# Patient Record
Sex: MALE | Race: WHITE | NOT HISPANIC OR LATINO | Employment: OTHER | URBAN - METROPOLITAN AREA
[De-identification: names, ages, dates, MRNs, and addresses within clinical notes are randomized per-mention and may not be internally consistent; named-entity substitution may affect disease eponyms.]

---

## 2021-04-08 DIAGNOSIS — Z23 ENCOUNTER FOR IMMUNIZATION: ICD-10-CM

## 2023-01-12 ENCOUNTER — HOSPITAL ENCOUNTER (INPATIENT)
Facility: HOSPITAL | Age: 73
LOS: 4 days | Discharge: HOME | End: 2023-01-16
Attending: EMERGENCY MEDICINE | Admitting: INTERNAL MEDICINE

## 2023-01-12 ENCOUNTER — APPOINTMENT (EMERGENCY)
Dept: RADIOLOGY | Facility: HOSPITAL | Age: 73
End: 2023-01-12
Attending: EMERGENCY MEDICINE

## 2023-01-12 DIAGNOSIS — U07.1 COVID: Primary | ICD-10-CM

## 2023-01-12 DIAGNOSIS — R09.02 HYPOXIA: ICD-10-CM

## 2023-01-12 DIAGNOSIS — R55 SYNCOPE: ICD-10-CM

## 2023-01-12 DIAGNOSIS — G20 PARKINSON'S DISEASE (HCC): ICD-10-CM

## 2023-01-12 DIAGNOSIS — R41.3 MEMORY LOSS: ICD-10-CM

## 2023-01-12 DIAGNOSIS — U07.1 COVID-19: ICD-10-CM

## 2023-01-12 PROBLEM — Z85.118 HISTORY OF LUNG CANCER: Status: ACTIVE | Noted: 2023-01-12

## 2023-01-12 PROBLEM — F32.A ANXIETY AND DEPRESSION: Status: ACTIVE | Noted: 2023-01-12

## 2023-01-12 PROBLEM — J44.1 CHRONIC OBSTRUCTIVE PULMONARY DISEASE WITH ACUTE EXACERBATION (HCC): Status: ACTIVE | Noted: 2023-01-12

## 2023-01-12 PROBLEM — Z85.46 HISTORY OF PROSTATE CANCER: Status: ACTIVE | Noted: 2023-01-12

## 2023-01-12 PROBLEM — F41.9 ANXIETY AND DEPRESSION: Status: ACTIVE | Noted: 2023-01-12

## 2023-01-12 PROBLEM — C25.9 PANCREATIC CANCER (HCC): Status: ACTIVE | Noted: 2023-01-12

## 2023-01-12 PROBLEM — G20.A1 PARKINSON'S DISEASE (HCC): Status: ACTIVE | Noted: 2023-01-12

## 2023-01-12 PROBLEM — R03.0 ELEVATED BLOOD PRESSURE READING: Status: ACTIVE | Noted: 2023-01-12

## 2023-01-12 LAB
2HR DELTA HS TROPONIN: 2 NG/L
4HR DELTA HS TROPONIN: 0 NG/L
ALBUMIN SERPL BCP-MCNC: 3.2 G/DL (ref 3.5–5)
ALP SERPL-CCNC: 60 U/L (ref 46–116)
ALT SERPL W P-5'-P-CCNC: 9 U/L (ref 12–78)
ANION GAP SERPL CALCULATED.3IONS-SCNC: 0 MMOL/L (ref 4–13)
APTT PPP: 31 SECONDS (ref 23–37)
AST SERPL W P-5'-P-CCNC: 14 U/L (ref 5–45)
ATRIAL RATE: 78 BPM
BACTERIA UR QL AUTO: ABNORMAL /HPF
BASOPHILS # BLD AUTO: 0.03 THOUSANDS/ÂΜL (ref 0–0.1)
BASOPHILS NFR BLD AUTO: 1 % (ref 0–1)
BILIRUB SERPL-MCNC: 0.6 MG/DL (ref 0.2–1)
BILIRUB UR QL STRIP: NEGATIVE
BUN SERPL-MCNC: 15 MG/DL (ref 5–25)
CALCIUM ALBUM COR SERPL-MCNC: 8.9 MG/DL (ref 8.3–10.1)
CALCIUM SERPL-MCNC: 8.3 MG/DL (ref 8.3–10.1)
CARDIAC TROPONIN I PNL SERPL HS: 11 NG/L
CARDIAC TROPONIN I PNL SERPL HS: 9 NG/L
CARDIAC TROPONIN I PNL SERPL HS: 9 NG/L
CHLORIDE SERPL-SCNC: 105 MMOL/L (ref 96–108)
CLARITY UR: CLEAR
CO2 SERPL-SCNC: 32 MMOL/L (ref 21–32)
COLOR UR: YELLOW
CREAT SERPL-MCNC: 1.06 MG/DL (ref 0.6–1.3)
EOSINOPHIL # BLD AUTO: 0.07 THOUSAND/ÂΜL (ref 0–0.61)
EOSINOPHIL NFR BLD AUTO: 1 % (ref 0–6)
ERYTHROCYTE [DISTWIDTH] IN BLOOD BY AUTOMATED COUNT: 13.7 % (ref 11.6–15.1)
FLUAV RNA RESP QL NAA+PROBE: NEGATIVE
FLUBV RNA RESP QL NAA+PROBE: NEGATIVE
GFR SERPL CREATININE-BSD FRML MDRD: 69 ML/MIN/1.73SQ M
GLUCOSE SERPL-MCNC: 111 MG/DL (ref 65–140)
GLUCOSE UR STRIP-MCNC: NEGATIVE MG/DL
HCT VFR BLD AUTO: 45 % (ref 36.5–49.3)
HGB BLD-MCNC: 14.2 G/DL (ref 12–17)
HGB UR QL STRIP.AUTO: NEGATIVE
IMM GRANULOCYTES # BLD AUTO: 0.06 THOUSAND/UL (ref 0–0.2)
IMM GRANULOCYTES NFR BLD AUTO: 1 % (ref 0–2)
INR PPP: 0.97 (ref 0.84–1.19)
KETONES UR STRIP-MCNC: ABNORMAL MG/DL
LACTATE SERPL-SCNC: 0.8 MMOL/L (ref 0.5–2)
LEUKOCYTE ESTERASE UR QL STRIP: NEGATIVE
LYMPHOCYTES # BLD AUTO: 0.51 THOUSANDS/ÂΜL (ref 0.6–4.47)
LYMPHOCYTES NFR BLD AUTO: 9 % (ref 14–44)
MAGNESIUM SERPL-MCNC: 1.8 MG/DL (ref 1.6–2.6)
MCH RBC QN AUTO: 31.2 PG (ref 26.8–34.3)
MCHC RBC AUTO-ENTMCNC: 31.6 G/DL (ref 31.4–37.4)
MCV RBC AUTO: 99 FL (ref 82–98)
MONOCYTES # BLD AUTO: 0.5 THOUSAND/ÂΜL (ref 0.17–1.22)
MONOCYTES NFR BLD AUTO: 9 % (ref 4–12)
MUCOUS THREADS UR QL AUTO: ABNORMAL
NEUTROPHILS # BLD AUTO: 4.67 THOUSANDS/ÂΜL (ref 1.85–7.62)
NEUTS SEG NFR BLD AUTO: 79 % (ref 43–75)
NITRITE UR QL STRIP: NEGATIVE
NON-SQ EPI CELLS URNS QL MICRO: ABNORMAL /HPF
NRBC BLD AUTO-RTO: 0 /100 WBCS
NT-PROBNP SERPL-MCNC: 604 PG/ML
P AXIS: 51 DEGREES
PH UR STRIP.AUTO: 8 [PH]
PLATELET # BLD AUTO: 141 THOUSANDS/UL (ref 149–390)
PLATELET # BLD AUTO: 149 THOUSANDS/UL (ref 149–390)
PMV BLD AUTO: 9.4 FL (ref 8.9–12.7)
PMV BLD AUTO: 9.6 FL (ref 8.9–12.7)
POTASSIUM SERPL-SCNC: 3.8 MMOL/L (ref 3.5–5.3)
PR INTERVAL: 188 MS
PROCALCITONIN SERPL-MCNC: 0.1 NG/ML
PROT SERPL-MCNC: 6.8 G/DL (ref 6.4–8.4)
PROT UR STRIP-MCNC: ABNORMAL MG/DL
PROTHROMBIN TIME: 13.5 SECONDS (ref 11.6–14.5)
QRS AXIS: 35 DEGREES
QRSD INTERVAL: 98 MS
QT INTERVAL: 390 MS
QTC INTERVAL: 444 MS
RBC # BLD AUTO: 4.55 MILLION/UL (ref 3.88–5.62)
RBC #/AREA URNS AUTO: ABNORMAL /HPF
RSV RNA RESP QL NAA+PROBE: NEGATIVE
SARS-COV-2 RNA RESP QL NAA+PROBE: POSITIVE
SODIUM SERPL-SCNC: 137 MMOL/L (ref 135–147)
SP GR UR STRIP.AUTO: 1.01 (ref 1–1.03)
T WAVE AXIS: 64 DEGREES
UROBILINOGEN UR STRIP-ACNC: <2 MG/DL
VENTRICULAR RATE: 78 BPM
WBC # BLD AUTO: 5.84 THOUSAND/UL (ref 4.31–10.16)
WBC #/AREA URNS AUTO: ABNORMAL /HPF

## 2023-01-12 PROCEDURE — XW033E5 INTRODUCTION OF REMDESIVIR ANTI-INFECTIVE INTO PERIPHERAL VEIN, PERCUTANEOUS APPROACH, NEW TECHNOLOGY GROUP 5: ICD-10-PCS | Performed by: INTERNAL MEDICINE

## 2023-01-12 PROCEDURE — 8E0ZXY6 ISOLATION: ICD-10-PCS | Performed by: INTERNAL MEDICINE

## 2023-01-12 RX ORDER — FESOTERODINE FUMARATE 8 MG/1
TABLET, EXTENDED RELEASE ORAL DAILY
COMMUNITY

## 2023-01-12 RX ORDER — PRIMIDONE 50 MG/1
50 TABLET ORAL
COMMUNITY

## 2023-01-12 RX ORDER — AMANTADINE HYDROCHLORIDE 100 MG/1
100 CAPSULE, GELATIN COATED ORAL 3 TIMES DAILY
COMMUNITY

## 2023-01-12 RX ORDER — ESCITALOPRAM OXALATE 20 MG/1
20 TABLET ORAL DAILY
COMMUNITY

## 2023-01-12 RX ORDER — PROPRANOLOL HYDROCHLORIDE 60 MG/1
30 TABLET ORAL
COMMUNITY
End: 2023-01-16

## 2023-01-12 RX ORDER — ZINC GLUCONATE 50 MG
TABLET ORAL
COMMUNITY

## 2023-01-12 RX ORDER — METHYLPREDNISOLONE SODIUM SUCCINATE 125 MG/2ML
125 INJECTION, POWDER, LYOPHILIZED, FOR SOLUTION INTRAMUSCULAR; INTRAVENOUS ONCE
Status: COMPLETED | OUTPATIENT
Start: 2023-01-12 | End: 2023-01-12

## 2023-01-12 RX ORDER — AMANTADINE HYDROCHLORIDE 100 MG/1
100 CAPSULE, GELATIN COATED ORAL 3 TIMES DAILY
Status: DISCONTINUED | OUTPATIENT
Start: 2023-01-12 | End: 2023-01-12

## 2023-01-12 RX ORDER — CLONAZEPAM 1 MG/1
2 TABLET ORAL
Status: DISCONTINUED | OUTPATIENT
Start: 2023-01-12 | End: 2023-01-16 | Stop reason: HOSPADM

## 2023-01-12 RX ORDER — UMECLIDINIUM 62.5 UG/1
1 AEROSOL, POWDER ORAL DAILY
COMMUNITY

## 2023-01-12 RX ORDER — ESCITALOPRAM OXALATE 20 MG/1
20 TABLET ORAL DAILY
Status: DISCONTINUED | OUTPATIENT
Start: 2023-01-12 | End: 2023-01-16 | Stop reason: HOSPADM

## 2023-01-12 RX ORDER — CLONAZEPAM 1 MG/1
2 TABLET ORAL
COMMUNITY

## 2023-01-12 RX ORDER — ALFUZOSIN HYDROCHLORIDE 10 MG/1
10 TABLET, EXTENDED RELEASE ORAL DAILY
COMMUNITY

## 2023-01-12 RX ORDER — SENNA PLUS 8.6 MG/1
1 TABLET ORAL DAILY
COMMUNITY

## 2023-01-12 RX ORDER — METHYLPREDNISOLONE SODIUM SUCCINATE 40 MG/ML
40 INJECTION, POWDER, LYOPHILIZED, FOR SOLUTION INTRAMUSCULAR; INTRAVENOUS EVERY 8 HOURS SCHEDULED
Status: DISCONTINUED | OUTPATIENT
Start: 2023-01-12 | End: 2023-01-14

## 2023-01-12 RX ORDER — ACETAMINOPHEN 325 MG/1
650 TABLET ORAL EVERY 6 HOURS PRN
Status: DISCONTINUED | OUTPATIENT
Start: 2023-01-12 | End: 2023-01-16 | Stop reason: HOSPADM

## 2023-01-12 RX ORDER — OXYBUTYNIN CHLORIDE 5 MG/1
10 TABLET, EXTENDED RELEASE ORAL DAILY
Status: DISCONTINUED | OUTPATIENT
Start: 2023-01-12 | End: 2023-01-16 | Stop reason: HOSPADM

## 2023-01-12 RX ORDER — MULTIVIT WITH MIN/MFOLATE/K2 340-15/3 G
5000 POWDER (GRAM) ORAL DAILY
COMMUNITY

## 2023-01-12 RX ORDER — ALBUTEROL SULFATE 90 UG/1
1 AEROSOL, METERED RESPIRATORY (INHALATION) EVERY 4 HOURS PRN
Status: DISCONTINUED | OUTPATIENT
Start: 2023-01-12 | End: 2023-01-16 | Stop reason: HOSPADM

## 2023-01-12 RX ORDER — MEMANTINE HYDROCHLORIDE 10 MG/1
10 TABLET ORAL 2 TIMES DAILY
COMMUNITY

## 2023-01-12 RX ORDER — ONDANSETRON 2 MG/ML
4 INJECTION INTRAMUSCULAR; INTRAVENOUS EVERY 6 HOURS PRN
Status: DISCONTINUED | OUTPATIENT
Start: 2023-01-12 | End: 2023-01-16 | Stop reason: HOSPADM

## 2023-01-12 RX ORDER — ZINC SULFATE 50(220)MG
220 CAPSULE ORAL DAILY
Status: DISCONTINUED | OUTPATIENT
Start: 2023-01-12 | End: 2023-01-16 | Stop reason: HOSPADM

## 2023-01-12 RX ORDER — MEMANTINE HYDROCHLORIDE 5 MG/1
10 TABLET ORAL 2 TIMES DAILY
Status: DISCONTINUED | OUTPATIENT
Start: 2023-01-12 | End: 2023-01-16 | Stop reason: HOSPADM

## 2023-01-12 RX ORDER — DEXAMETHASONE SODIUM PHOSPHATE 4 MG/ML
6 INJECTION, SOLUTION INTRA-ARTICULAR; INTRALESIONAL; INTRAMUSCULAR; INTRAVENOUS; SOFT TISSUE EVERY 24 HOURS
Status: CANCELLED | OUTPATIENT
Start: 2023-01-12 | End: 2023-01-22

## 2023-01-12 RX ORDER — MULTIVIT WITH MINERALS/LUTEIN
1000 TABLET ORAL DAILY
COMMUNITY

## 2023-01-12 RX ORDER — FLUTICASONE FUROATE AND VILANTEROL 200; 25 UG/1; UG/1
1 POWDER RESPIRATORY (INHALATION) DAILY
COMMUNITY

## 2023-01-12 RX ORDER — ACETAMINOPHEN 325 MG/1
650 TABLET ORAL ONCE
Status: COMPLETED | OUTPATIENT
Start: 2023-01-12 | End: 2023-01-12

## 2023-01-12 RX ORDER — PHENOL 1.4 %
AEROSOL, SPRAY (ML) MUCOUS MEMBRANE
COMMUNITY

## 2023-01-12 RX ORDER — PRIMIDONE 50 MG/1
50 TABLET ORAL
Status: DISCONTINUED | OUTPATIENT
Start: 2023-01-12 | End: 2023-01-16 | Stop reason: HOSPADM

## 2023-01-12 RX ORDER — AMANTADINE HYDROCHLORIDE 100 MG/1
100 CAPSULE, GELATIN COATED ORAL 2 TIMES DAILY
Status: DISCONTINUED | OUTPATIENT
Start: 2023-01-12 | End: 2023-01-16 | Stop reason: HOSPADM

## 2023-01-12 RX ORDER — SENNOSIDES 8.6 MG
3 TABLET ORAL 2 TIMES DAILY
Status: DISCONTINUED | OUTPATIENT
Start: 2023-01-12 | End: 2023-01-16 | Stop reason: HOSPADM

## 2023-01-12 RX ORDER — ENOXAPARIN SODIUM 100 MG/ML
40 INJECTION SUBCUTANEOUS DAILY
Status: DISCONTINUED | OUTPATIENT
Start: 2023-01-12 | End: 2023-01-13

## 2023-01-12 RX ORDER — LANOLIN ALCOHOL/MO/W.PET/CERES
9 CREAM (GRAM) TOPICAL
Status: DISCONTINUED | OUTPATIENT
Start: 2023-01-12 | End: 2023-01-16 | Stop reason: HOSPADM

## 2023-01-12 RX ORDER — FLUTICASONE FUROATE AND VILANTEROL 200; 25 UG/1; UG/1
1 POWDER RESPIRATORY (INHALATION) DAILY
Status: DISCONTINUED | OUTPATIENT
Start: 2023-01-12 | End: 2023-01-16 | Stop reason: HOSPADM

## 2023-01-12 RX ORDER — SENNOSIDES 8.6 MG
1 TABLET ORAL DAILY
Status: DISCONTINUED | OUTPATIENT
Start: 2023-01-12 | End: 2023-01-12

## 2023-01-12 RX ORDER — MAGNESIUM HYDROXIDE/ALUMINUM HYDROXICE/SIMETHICONE 120; 1200; 1200 MG/30ML; MG/30ML; MG/30ML
30 SUSPENSION ORAL EVERY 6 HOURS PRN
Status: DISCONTINUED | OUTPATIENT
Start: 2023-01-12 | End: 2023-01-16 | Stop reason: HOSPADM

## 2023-01-12 RX ORDER — POLYETHYLENE GLYCOL 3350 17 G/17G
17 POWDER, FOR SOLUTION ORAL DAILY
Status: DISCONTINUED | OUTPATIENT
Start: 2023-01-12 | End: 2023-01-16 | Stop reason: HOSPADM

## 2023-01-12 RX ADMIN — AMANTADINE HYDROCHLORIDE 100 MG: 100 CAPSULE ORAL at 15:30

## 2023-01-12 RX ADMIN — REMDESIVIR 200 MG: 100 INJECTION, POWDER, LYOPHILIZED, FOR SOLUTION INTRAVENOUS at 18:20

## 2023-01-12 RX ADMIN — SODIUM CHLORIDE 1000 ML: 0.9 INJECTION, SOLUTION INTRAVENOUS at 06:51

## 2023-01-12 RX ADMIN — CARBIDOPA AND LEVODOPA 2 TABLET: 25; 100 TABLET ORAL at 18:19

## 2023-01-12 RX ADMIN — CLONAZEPAM 2 MG: 1 TABLET ORAL at 20:43

## 2023-01-12 RX ADMIN — OXYBUTYNIN CHLORIDE 10 MG: 5 TABLET, EXTENDED RELEASE ORAL at 18:19

## 2023-01-12 RX ADMIN — ZINC SULFATE 220 MG (50 MG) CAPSULE 220 MG: CAPSULE at 13:01

## 2023-01-12 RX ADMIN — CARBIDOPA AND LEVODOPA 2 TABLET: 25; 100 TABLET ORAL at 20:42

## 2023-01-12 RX ADMIN — METHYLPREDNISOLONE SODIUM SUCCINATE 125 MG: 125 INJECTION, POWDER, FOR SOLUTION INTRAMUSCULAR; INTRAVENOUS at 07:02

## 2023-01-12 RX ADMIN — METHYLPREDNISOLONE SODIUM SUCCINATE 40 MG: 40 INJECTION, POWDER, FOR SOLUTION INTRAMUSCULAR; INTRAVENOUS at 20:44

## 2023-01-12 RX ADMIN — CARBIDOPA AND LEVODOPA 3 TABLET: 25; 100 TABLET ORAL at 15:30

## 2023-01-12 RX ADMIN — FLUTICASONE FUROATE AND VILANTEROL TRIFENATATE 1 PUFF: 200; 25 POWDER RESPIRATORY (INHALATION) at 13:03

## 2023-01-12 RX ADMIN — AZITHROMYCIN MONOHYDRATE 500 MG: 500 INJECTION, POWDER, LYOPHILIZED, FOR SOLUTION INTRAVENOUS at 13:07

## 2023-01-12 RX ADMIN — PROPRANOLOL HYDROCHLORIDE 30 MG: 20 TABLET ORAL at 15:29

## 2023-01-12 RX ADMIN — MEMANTINE 10 MG: 5 TABLET ORAL at 20:44

## 2023-01-12 RX ADMIN — CARBIDOPA AND LEVODOPA 3 TABLET: 25; 100 TABLET ORAL at 13:02

## 2023-01-12 RX ADMIN — PRIMIDONE 50 MG: 50 TABLET ORAL at 20:44

## 2023-01-12 RX ADMIN — METHYLPREDNISOLONE SODIUM SUCCINATE 40 MG: 40 INJECTION, POWDER, FOR SOLUTION INTRAMUSCULAR; INTRAVENOUS at 15:30

## 2023-01-12 RX ADMIN — UMECLIDINIUM 1 PUFF: 62.5 AEROSOL, POWDER ORAL at 13:03

## 2023-01-12 RX ADMIN — ENOXAPARIN SODIUM 40 MG: 100 INJECTION SUBCUTANEOUS at 13:03

## 2023-01-12 RX ADMIN — ACETAMINOPHEN 650 MG: 325 TABLET ORAL at 07:02

## 2023-01-12 RX ADMIN — Medication 9 MG: at 20:44

## 2023-01-12 RX ADMIN — STANDARDIZED SENNA CONCENTRATE 25.8 MG: 8.6 TABLET ORAL at 20:43

## 2023-01-12 NOTE — ASSESSMENT & PLAN NOTE
· History of tobacco use  · Not chronically on O2  · Exacerbation in setting of COVID infection  · Continue IV steroids, scheduled neb treatments  · Respiratory protocol   · Zithromax x3 days

## 2023-01-12 NOTE — ASSESSMENT & PLAN NOTE
· On arrival to the ED  · Improved without further intervention  · Continue to monitor per unit protocol  · Propanolol on home medications likely prescribed for tremor   · Given parkinson's need to monitor for orthostatic BP

## 2023-01-12 NOTE — ASSESSMENT & PLAN NOTE
· SpO2 88% on RA - improved to 98% on 2 L NC  · Not on O2 at baseline  · CXR without focal consolidation or vascular congestion   · Procalcitonin normal  · Suspect related   COVID infection, COPD exac  · Continue O2 supplementation to maintain SpO2 > 88%

## 2023-01-12 NOTE — ED NOTES
Primary RN working closely with patient wife and pharmacy to ensure home medication orders are accurate       Keshawn Cohen RN  01/12/23 [id number]

## 2023-01-12 NOTE — H&P
[place]  H&P- Eula Oneil 1950, 67 y o  male MRN: [id number]  Unit/Bed#: ED 01 Encounter: [id number]  Primary Care Provider: Cassia Grimaldo DO   Date and time admitted to hospital: 1/12/2023  6:33 AM    * Hypoxia  Assessment & Plan  · SpO2 88% on RA - improved to 98% on 2 L NC  · Not on O2 at baseline  · CXR without focal consolidation or vascular congestion - f/u final read  · Procalcitonin normal  · Suspect related to COPD exacerbation in setting of COVID infection  · Continue O2 supplementation to maintain SpO2 > 88%    COVID-19  Assessment & Plan  · Presented to the ED following syncopal episode  Last few days has been having cough/congestion, fever  · Tmax 102 7 - does not meet any other SIRS criteria  · COVID + 1/12/23  · Currently requiring 2 L NC  · Treat via mild pathway - remdesivir/decadron  · Troponin WNL - follow up 2hr  · BNP only mildly elevated at 604  · Check d-dimer in order to dose inpt anticoagulation**    Chronic obstructive pulmonary disease with acute exacerbation (HCC)  Assessment & Plan  · History of tobacco use  · Not chronically on O2  · Exacerbation in setting of COVID infection  · Continue IV steroids, scheduled neb treatments  · Respiratory protocol  · Zithromax x3 days    Syncope  Assessment & Plan  · Presented to the ED with witnessed syncopal episode at home, reportedly no head strike  · Suspect related to hypoxia/COVID infection, possible orthostatic hypotension  · Check orthostatic BP q shift  · Per chart review of recent hematology note - pt had echocardiogram in October 2022 with EF 33% grade 1 diastolic dysfunction    No significant valvular disease  · Monitor on telemetry**    Anxiety and depression  Assessment & Plan  · Continue lexapro  · Continue klonopin qhs  · Confirmed via PDMP - last refill Oct 2022 for 90 day supply    Elevated blood pressure reading  Assessment & Plan  · On arrival to the ED  · Improved without further intervention  · Continue to monitor per unit protocol  · Propanolol on home medications likely prescribed for tremor**  · Given parkinson's need to monitor for orthostatic BP    Memory loss  Assessment & Plan  · Alert and oriented x3 on admission **  · Continue memantine    Parkinson's disease ([address] )  Assessment & Plan  · Continue sinemet, primidone, amantadine**  · Outpatient follow up with [facility]    History of lung cancer  Assessment & Plan  · Follows with [facility] oncology  · Recently completed radiation treatment in Nov 2022  · Also with history of prostate cancer s/p radiation, on Alfuzosin**    Pancreatic cancer [facility])  Assessment & Plan  · Recently diagnosed, follows with [facility]  · Scheduled for whipple procedure Feb 2023    VTE Pharmacologic Prophylaxis: VTE Score: 8 lovenox  Code Status: No Order    Discussion with family: pt wife bedside  Anticipated Length of Stay: Patient will be admitted on an inpatient basis with an anticipated length of stay of greater than 2 midnights secondary to hypoxia, COPD exacerbation, COVID 19  Total Time for Visit, including Counseling / Coordination of Care: 45 minutes Greater than 50% of this total time spent on direct patient counseling and coordination of care  Chief Complaint: Syncope    History of Present Illness:  Edgar Moore is a 67 y o  male with a PMH of lung cancer s/p radiation, prostate cancer s/p radiation, recently diagnosed pancreatic cancer, parkinson's, memory loss, COPD with prior tobacco use who presents with syncope  Patient presented to the ED following witnessed syncopal episode by his wife while ambulating  He reportedly had begun to feel lightheaded and was eased to the ground by his wife  No reported head strike or extremity injury  On EMS arrival patient was reportedly with SpO2 in the 80s  Not on chronic oxygen at home  Received nebulizer treatment in the ED with symptomatic improvement    Admits to a couple day history of congestion, cough, and fevers  home  Currently denies any chest pain or abdominal pain     Had a negative home COVID test 2 days prior  Review of Systems:  Review of Systems    Past Medical and Surgical History:   Past Medical History:   Diagnosis Date   • COPD (chronic obstructive pulmonary disease) ([facility] )    • Pancreatic cancer ([facility] )    • Parkinson's disease ([facility] )        History reviewed  No pertinent surgical history  Meds/Allergies:  Prior to Admission medications    Not on File     I have reviewed home medications with patient family member  Allergies: No Known Allergies    Social History:  Marital Status: /Civil Union      Substance Use History:   Social History     Substance and Sexual Activity   Alcohol Use None     Social History     Tobacco Use   Smoking Status Unknown   Smokeless Tobacco Not on file     Social History     Substance and Sexual Activity   Drug Use Never       Family History:  Family hx : positive for daughter with [address]       Physical Exam:     Vitals:   Blood Pressure: 167/77 (01/12/23 0900)  Pulse: 67 (01/12/23 0900)  Temperature: (!) 102 7 °F (39 3 °C) (01/12/23 0633)  Temp Source: Oral (01/12/23 1654)  Respirations: 18 (01/12/23 0900)  Height: 5' 9" (175 3 cm) (01/12/23 0633)  SpO2: 97 % (01/12/23 0900)    Physical Exam       GEN: No acute distress, comfortable, elderly male on o2  HEEENT: No JVD, PERRLA, no scleral icterus  RESP: Lungs wheezes  CV: RRR, +s1/s2   ABD: SOFT NON TENDER, POSITIVE BOWEL SOUNDS, NO DISTENTION  PSYCH: CALM  NEURO:  , NO FOCAL DEFICITS  SKIN: NO RASH  EXTREM: NO EDEMA      Additional Data:     Lab Results:  Results from last 7 days   Lab Units 01/12/23  0646   WBC Thousand/uL 5 84   HEMOGLOBIN g/dL 14 2   HEMATOCRIT % 45 0   PLATELETS Thousands/uL 141*   NEUTROS PCT % 79*   LYMPHS PCT % 9*   MONOS PCT % 9   EOS PCT % 1     Results from last 7 days   Lab Units 01/12/23  0646   SODIUM mmol/L 137   POTASSIUM mmol/L 3 8   CHLORIDE mmol/L 105   CO2 mmol/L 32   BUN mg/dL 15   CREATININE mg/dL 1 06   ANION GAP mmol/L 0*   CALCIUM mg/dL 8 3   ALBUMIN g/dL 3 2*   TOTAL BILIRUBIN mg/dL 0 60   ALK PHOS U/L 60   ALT U/L 9*   AST U/L 14   GLUCOSE RANDOM mg/dL 111     Results from last 7 days   Lab Units 01/12/23  0646   INR  0 97             Results from last 7 days   Lab Units 01/12/23  0646   LACTIC ACID mmol/L 0 8   PROCALCITONIN ng/ml 0 10       Lines/Drains:  Invasive Devices     Peripheral Intravenous Line  Duration           Peripheral IV 01/12/23 Distal;Left;Upper;Ventral (anterior) Arm <1 day    Peripheral IV 01/12/23 Proximal;Right;Ventral (anterior) Forearm <1 day                    Imaging: Reviewed radiology reports from this admission including: chest xray  XR chest portable    (Results Pending)     ·      ** Please Note: This note has been constructed using a voice recognition system   **

## 2023-01-12 NOTE — ED NOTES
Patient wife at bedside informed primary RN that patient is due soon (noon) for his dose of carbidopa/levodopa  Attending physician notified via Jerry Esteves RN  01/12/23 3869

## 2023-01-12 NOTE — ASSESSMENT & PLAN NOTE
· Follows with [facility] oncology  · Recently completed radiation treatment in Nov 2022  · Also with history of prostate cancer s/p radiation, on Alfuzosin**

## 2023-01-12 NOTE — ASSESSMENT & PLAN NOTE
· Presented to the ED with witnessed syncopal episode at home, reportedly no head strike  · Suspect related to hypoxia/COVID infection, possible orthostatic hypotension  · Check orthostatic BP q shift  · Per chart review of recent hematology note - pt had echocardiogram in October 2022 with EF 61% grade 1 diastolic dysfunction    No significant valvular disease  · Monitor on telemetry

## 2023-01-12 NOTE — LETTER
Thank you for allowing us to participate in the care of your patient, Reynaldo Lopez, who was hospitalized from 1/12 through 1/16/2023 with the admitting diagnosis of syncope and COVID 19  The patient was treated for COVID with IV steroids and remdesivir + supplemental O2  The patient's propranolol was discontinued due to bradycardia  He should follow up with outpatient cardiology  If you have any additional questions or would like to discuss further, please feel free to contact me      EDSON Ayala  Internal Medicine, Hospitalist  [phone]

## 2023-01-12 NOTE — PLAN OF CARE
Problem: PAIN - ADULT  Goal: Verbalizes/displays adequate comfort level or baseline comfort level  Description: Interventions:  - Encourage patient to monitor pain and request assistance  - Assess pain using appropriate pain scale  - Administer analgesics based on type and severity of pain and evaluate response  - Implement non-pharmacological measures as appropriate and evaluate response  - Consider cultural and social influences on pain and pain management  - Notify physician/advanced practitioner if interventions unsuccessful or patient reports new pain  Outcome: Progressing     Problem: INFECTION - ADULT  Goal: Absence or prevention of progression during hospitalization  Description: INTERVENTIONS:  - Assess and monitor for signs and symptoms of infection  - Monitor lab/diagnostic results  - Monitor all insertion sites, i e  indwelling lines, tubes, and drains  - Monitor endotracheal if appropriate and nasal secretions for changes in amount and color  - Wells appropriate cooling/warming therapies per order  - Administer medications as ordered  - Instruct and encourage patient and family to use good hand hygiene technique  - Identify and instruct in appropriate isolation precautions for identified infection/condition  Outcome: Progressing     Problem: SAFETY ADULT  Goal: Patient will remain free of falls  Description: INTERVENTIONS:  - Educate patient/family on patient safety including physical limitations  - Instruct patient to call for assistance with activity   - Consult OT/PT to assist with strengthening/mobility   - Keep Call bell within reach  - Keep bed low and locked with side rails adjusted as appropriate  - Keep care items and personal belongings within reach  - Initiate and maintain comfort rounds  - Make Fall Risk Sign visible to staff  - Offer Toileting every 2 Hours, in advance of need  - Initiate/Maintain bed alarm  - Obtain necessary fall risk management equipment: socks, alarm  - Apply yellow socks and bracelet for high fall risk patients  - Consider moving patient to room near nurses station  Outcome: Progressing  Goal: Maintain or return to baseline ADL function  Description: INTERVENTIONS:  -  Assess patient's ability to carry out ADLs; assess patient's baseline for ADL function and identify physical deficits which impact ability to perform ADLs (bathing, care of mouth/teeth, toileting, grooming, dressing, etc )  - Assess/evaluate cause of self-care deficits   - Assess range of motion  - Assess patient's mobility; develop plan if impaired  - Assess patient's need for assistive devices and provide as appropriate  - Encourage maximum independence but intervene and supervise when necessary  - Involve family in performance of ADLs  - Assess for home care needs following discharge   - Consider OT consult to assist with ADL evaluation and planning for discharge  - Provide patient education as appropriate  Outcome: Progressing  Goal: Maintains/Returns to pre admission functional level  Description: INTERVENTIONS:  - Perform BMAT or MOVE assessment daily    - Set and communicate daily mobility goal to care team and patient/family/caregiver  - Collaborate with rehabilitation services on mobility goals if consulted  - Perform Range of Motion 3 times a day  - Reposition patient every 2 hours    - Dangle patient 3 times a day  - Stand patient 3 times a day  - Ambulate patient 3 times a day  - Out of bed to chair 3 times a day   - Out of bed for meals 3 times a day  - Out of bed for toileting  - Record patient progress and toleration of activity level   Outcome: Progressing     Problem: DISCHARGE PLANNING  Goal: Discharge to home or other facility with appropriate resources  Description: INTERVENTIONS:  - Identify barriers to discharge w/patient and caregiver  - Arrange for needed discharge resources and transportation as appropriate  - Identify discharge learning needs (meds, wound care, etc )  - Arrange for interpretive services to assist at discharge as needed  - Refer to Case Management Department for coordinating discharge planning if the patient needs post-hospital services based on physician/advanced practitioner order or complex needs related to functional status, cognitive ability, or social support system  Outcome: Progressing     Problem: Knowledge Deficit  Goal: Patient/family/caregiver demonstrates understanding of disease process, treatment plan, medications, and discharge instructions  Description: Complete learning assessment and assess knowledge base  Interventions:  - Provide teaching at level of understanding  - Provide teaching via preferred learning methods  Outcome: Progressing     Problem: MOBILITY - ADULT  Goal: Maintain or return to baseline ADL function  Description: INTERVENTIONS:  -  Assess patient's ability to carry out ADLs; assess patient's baseline for ADL function and identify physical deficits which impact ability to perform ADLs (bathing, care of mouth/teeth, toileting, grooming, dressing, etc )  - Assess/evaluate cause of self-care deficits   - Assess range of motion  - Assess patient's mobility; develop plan if impaired  - Assess patient's need for assistive devices and provide as appropriate  - Encourage maximum independence but intervene and supervise when necessary  - Involve family in performance of ADLs  - Assess for home care needs following discharge   - Consider OT consult to assist with ADL evaluation and planning for discharge  - Provide patient education as appropriate  Outcome: Progressing  Goal: Maintains/Returns to pre admission functional level  Description: INTERVENTIONS:  - Perform BMAT or MOVE assessment daily    - Set and communicate daily mobility goal to care team and patient/family/caregiver  - Collaborate with rehabilitation services on mobility goals if consulted  - Perform Range of Motion 3 times a day  - Reposition patient every 2 hours    - Dangle patient 3 times a day  - Stand patient 3 times a day  - Ambulate patient 3 times a day  - Out of bed to chair 3 times a day   - Out of bed for meals 3 times a day  - Out of bed for toileting  - Record patient progress and toleration of activity level   Outcome: Progressing

## 2023-01-12 NOTE — ASSESSMENT & PLAN NOTE
· Continue lexapro  · Continue klonopin qhs  · Confirmed via PDMP - last refill Oct 2022 for 90 day supply

## 2023-01-12 NOTE — ED PROVIDER NOTES
History  No chief complaint on file  68 yo M with PMH of COPD, PD, prior pancreatic, lung, and prostate CA (pt states all in remission) BIBA from home after a witnessed syncopal event  Was walking to the bathroom, felt lightheaded, his wife was with him, helped him to the ground  No trauma  No head injury  Ems arrived, pt was hypoxic 80s  Improved on neb  Pt is alert, oriented, denies any pain  Feels improved after neb  Reports a neg covid home test 2 days ago, but has been congested, fevers, cough  States tolerating po  Denies abd pain or gi/gu complaints  No leg pain/swelling  History provided by:  Patient and medical records   used: No    Syncope  Episode history:  Single  Most recent episode: Today  Progression:  Resolved  Chronicity:  New  Context: exertion    Witnessed: yes    Relieved by:  Lying down  Ineffective treatments:  None tried  Associated symptoms: fever and shortness of breath    Associated symptoms: no chest pain, no confusion, no diaphoresis, no dizziness, no headaches, no nausea, no palpitations, no recent fall and no vomiting        None       Past Medical History:   Diagnosis Date   • [facility] (chronic obstructive pulmonary disease) ([facility] )    • Pancreatic cancer ([facility] )    • Parkinson's disease ([facility] )        History reviewed  No pertinent surgical history  History reviewed  No pertinent family history  I have reviewed and agree with the history as documented  E-Cigarette/Vaping     E-Cigarette/Vaping Substances     Social History     Tobacco Use   • Smoking status: Unknown   Substance Use Topics   • Drug use: Never       Review of Systems   Constitutional: Positive for fatigue and fever  Negative for chills, diaphoresis and unexpected weight change  HENT: Positive for congestion  Negative for ear pain, rhinorrhea, sore throat, trouble swallowing and voice change  Eyes: Negative for pain and visual disturbance     Respiratory: Positive for cough and shortness of breath  Negative for chest tightness  Cardiovascular: Positive for syncope  Negative for chest pain, palpitations and leg swelling  Gastrointestinal: Negative for abdominal pain, blood in stool, constipation, diarrhea, nausea and vomiting  Genitourinary: Negative for difficulty urinating and hematuria  Musculoskeletal: Negative for arthralgias, back pain and neck pain  Skin: Negative for rash  Neurological: Positive for syncope  Negative for dizziness, light-headedness and headaches  Psychiatric/Behavioral: Negative for confusion and suicidal ideas  The patient is not nervous/anxious  Physical Exam  Physical Exam  Vitals and nursing note reviewed  Constitutional:       General: He is not in acute distress  Appearance: He is well-developed  He is obese  He is ill-appearing  He is not diaphoretic  HENT:      Head: Normocephalic and atraumatic  Right Ear: External ear normal       Left Ear: External ear normal       Nose: Nose normal       Mouth/Throat:      Mouth: Mucous membranes are dry  Eyes:      General: No scleral icterus  Right eye: No discharge  Left eye: No discharge  Conjunctiva/sclera: Conjunctivae normal       Pupils: Pupils are equal, round, and reactive to light  Neck:      Vascular: No JVD  Trachea: No tracheal deviation  Cardiovascular:      Rate and Rhythm: Normal rate and regular rhythm  Heart sounds: Normal heart sounds  No murmur heard  No friction rub  No gallop  Pulmonary:      Effort: Pulmonary effort is normal  No respiratory distress  Breath sounds: Decreased air movement present  No stridor  Wheezing present  No rales  Chest:      Chest wall: No tenderness  Abdominal:      General: Bowel sounds are normal  There is no distension  Palpations: Abdomen is soft  Tenderness: There is no abdominal tenderness  There is no guarding or rebound  Musculoskeletal:         General: No tenderness or deformity  Normal range of motion  Cervical back: Normal, normal range of motion and neck supple  Thoracic back: Normal       Lumbar back: Normal    Lymphadenopathy:      Cervical: No cervical adenopathy  Skin:     General: Skin is warm and dry  Findings: No rash  Neurological:      General: No focal deficit present  Mental Status: He is alert and oriented to person, place, and time  GCS: GCS eye subscore is 4  GCS verbal subscore is 5  GCS motor subscore is 6  Cranial Nerves: No cranial nerve deficit  Sensory: Sensation is intact  No sensory deficit  Motor: Tremor (intention) present        Coordination: Coordination normal    Psychiatric:         Behavior: Behavior normal          Vital Signs  ED Triage Vitals [01/12/23 0633]   Temperature Pulse Respirations Blood Pressure SpO2   (!) 102 7 °F (39 3 °C) 76 18 (!) 177/90 93 %      Temp Source Heart Rate Source Patient Position - Orthostatic VS BP Location FiO2 (%)   Oral -- Lying Right arm --      Pain Score       --           Vitals:    01/12/23 0633   BP: (!) 177/90   Pulse: 76   Patient Position - Orthostatic VS: Lying         Visual Acuity      ED Medications  Medications   sodium chloride 0 9 % bolus 1,000 mL (1,000 mL Intravenous New Bag 1/12/23 0651)     Followed by   sodium chloride 0 9 % bolus 1,000 mL (has no administration in time range)     Followed by   sodium chloride 0 9 % bolus 1,000 mL (has no administration in time range)   methylPREDNISolone sodium succinate (Solu-MEDROL) injection 125 mg (has no administration in time range)   acetaminophen (TYLENOL) tablet 650 mg (has no administration in time range)       Diagnostic Studies  Results Reviewed     Procedure Component Value Units Date/Time    CBC and differential [[id number]] Collected: 01/12/23 0646    Lab Status: No result Specimen: Blood from Arm, Left     Comprehensive metabolic panel [[id number]] Collected: 01/12/23 0646    Lab Status: No result Specimen: Blood from Arm, Left     Lactic acid [[id number]] Collected: 01/12/23 0646    Lab Status: No result Specimen: Blood from Arm, Left     Procalcitonin [[id number]] Collected: 01/12/23 0646    Lab Status: No result Specimen: Blood from Arm, Left     Protime-INR [[id number]] Collected: 01/12/23 0646    Lab Status: No result Specimen: Blood from Arm, Left     APTT [[id number]] Collected: 01/12/23 0646    Lab Status: No result Specimen: Blood from Arm, Left     Blood culture #1 [[id number]] Collected: 01/12/23 0646    Lab Status: No result Specimen: Blood from Arm, Left     Blood culture #2 [[id number]] Collected: 01/12/23 0646    Lab Status: No result Specimen: Blood from Arm, Right     NT-BNP PRO [[id number]] Collected: 01/12/23 0646    Lab Status: No result Specimen: Blood from Arm, Left     Magnesium [[id number]] Collected: 01/12/23 0646    Lab Status: No result Specimen: Blood from Arm, Left     HS Troponin 0hr (reflex protocol) [[id number]] Collected: 01/12/23 0646    Lab Status: No result Specimen: Blood from Arm, Left     UA w Reflex to Microscopic w Reflex to Culture [[id number]]     Lab Status: No result Specimen: Urine     Sputum culture and Gram stain [[id number]]     Lab Status: No result Specimen: Sputum     FLU/RSV/COVID - if FLU/RSV clinically relevant [[id number]]     Lab Status: No result Specimen: Nares from Nose                  XR chest portable    (Results Pending)              Procedures  ECG 12 Lead Documentation Only    Date/Time: 1/12/2023 6:43 AM  Performed by: Luan Holland MD  Authorized by: Luan Holland MD     Indications / Diagnosis:  Syncope  ECG reviewed by me, the ED Provider: yes    Patient location:  ED  Previous ECG:     Previous ECG:  Unavailable    Comparison to cardiac monitor: Yes    Interpretation:     Interpretation: non-specific    Quality:     Tracing quality:  Limited by artifact  Rate:     ECG rate:  78    ECG rate assessment: normal    Rhythm:     Rhythm: sinus rhythm Ectopy:     Ectopy: none    QRS:     QRS axis:  Normal    QRS intervals:  Normal  Conduction:     Conduction: normal    ST segments:     ST segments:  Normal  T waves:     T waves: non-specific               ED Course  ED Course as of 01/12/23 0654   Thu Jan 12, 2023   0653 S/o to Dr Villalta Early at end of shift  URI sx, COPD  Syncope at home, hypoxic 88% for EMS  Improved after neb prehospital, stable on RA on arrival  Septic workup pend  Dispo accordingly  Ambulatory independently at home  Initial Sepsis Screening     Row Name 01/12/23 9701                Is the patient's history suggestive of a new or worsening infection? Yes (Proceed)  -SM        Suspected source of infection suspect infection, source unknown  -SM        Are two or more of the following signs & symptoms of infection both present and new to the patient? Yes (Proceed)  -SM        Indicate SIRS criteria Hyperthemia > 38 3C (100 9F)  -SM        If the answer is yes to both questions, suspicion of sepsis is present --        If severe sepsis is present AND tissue hypoperfusion perists in the hour after fluid resuscitation or lactate > 4, the patient meets criteria for SEPTIC SHOCK --        Are any of the following organ dysfunction criteria present within 6 hours of suspected infection and SIRS criteria that are NOT considered to be chronic conditions?  --        Organ dysfunction --        Date of presentation of severe sepsis --        Time of presentation of severe sepsis --        Tissue hypoperfusion persists in the hour after crystalloid fluid administration, evidenced, by either: --        Was hypotension present within one hour of the conclusion of crystalloid fluid administration? --        Date of presentation of septic shock --        Time of presentation of septic shock --              User Key  (r) = Recorded By, (t) = Taken By, (c) = Cosigned By    [address] Name Provider Type    MAICOL Belle MD Physician                              Medical Decision Making  Amount and/or Complexity of Data Reviewed  Independent Historian: EMS  Labs: ordered  Radiology: ordered  ECG/medicine tests: ordered and independent interpretation performed  Decision-making details documented in ED Course  Risk  OTC drugs  Prescription drug management  Disposition  Final diagnoses:   None     ED Disposition     None      Follow-up Information    None         Patient's Medications    No medications on file       No discharge procedures on file      PDMP Review     None          ED Provider  Electronically Signed by           Trixie Holter, MD  01/12/23 7242

## 2023-01-12 NOTE — ASSESSMENT & PLAN NOTE
· Presented to the ED following syncopal episode  Last few days has been having cough/congestion, fever  · Tmax 102 7 - does not meet any other SIRS criteria  · COVID + 1/12/23  · Currently requiring 2 L NC  · Treat via mild pathway - remdesivir/decadron  · Troponin WNL - follow up 2hr  · BNP only mildly elevated at 604  · lovenox vte ppx

## 2023-01-12 NOTE — ASSESSMENT & PLAN NOTE
· Continue sinemet, primidone, amantadine   · Outpatient follow up with [facility]

## 2023-01-13 LAB
ALBUMIN SERPL BCP-MCNC: 2.9 G/DL (ref 3.5–5)
ALP SERPL-CCNC: 59 U/L (ref 46–116)
ALT SERPL W P-5'-P-CCNC: <6 U/L (ref 12–78)
ANION GAP SERPL CALCULATED.3IONS-SCNC: 4 MMOL/L (ref 4–13)
APTT PPP: 30 SECONDS (ref 23–37)
APTT PPP: 58 SECONDS (ref 23–37)
AST SERPL W P-5'-P-CCNC: 11 U/L (ref 5–45)
BASOPHILS # BLD AUTO: 0 THOUSANDS/ÂΜL (ref 0–0.1)
BASOPHILS NFR BLD AUTO: 0 % (ref 0–1)
BILIRUB SERPL-MCNC: 0.4 MG/DL (ref 0.2–1)
BUN SERPL-MCNC: 20 MG/DL (ref 5–25)
CALCIUM ALBUM COR SERPL-MCNC: 9.6 MG/DL (ref 8.3–10.1)
CALCIUM SERPL-MCNC: 8.7 MG/DL (ref 8.3–10.1)
CHLORIDE SERPL-SCNC: 108 MMOL/L (ref 96–108)
CO2 SERPL-SCNC: 28 MMOL/L (ref 21–32)
CREAT SERPL-MCNC: 0.91 MG/DL (ref 0.6–1.3)
D DIMER PPP FEU-MCNC: 1.13 UG/ML FEU
EOSINOPHIL # BLD AUTO: 0 THOUSAND/ÂΜL (ref 0–0.61)
EOSINOPHIL NFR BLD AUTO: 0 % (ref 0–6)
ERYTHROCYTE [DISTWIDTH] IN BLOOD BY AUTOMATED COUNT: 13.3 % (ref 11.6–15.1)
GFR SERPL CREATININE-BSD FRML MDRD: 83 ML/MIN/1.73SQ M
GLUCOSE SERPL-MCNC: 182 MG/DL (ref 65–140)
HCT VFR BLD AUTO: 43.7 % (ref 36.5–49.3)
HGB BLD-MCNC: 13.6 G/DL (ref 12–17)
IMM GRANULOCYTES # BLD AUTO: 0.02 THOUSAND/UL (ref 0–0.2)
IMM GRANULOCYTES NFR BLD AUTO: 0 % (ref 0–2)
INR PPP: 0.97 (ref 0.84–1.19)
LYMPHOCYTES # BLD AUTO: 0.45 THOUSANDS/ÂΜL (ref 0.6–4.47)
LYMPHOCYTES NFR BLD AUTO: 7 % (ref 14–44)
MCH RBC QN AUTO: 30.6 PG (ref 26.8–34.3)
MCHC RBC AUTO-ENTMCNC: 31.1 G/DL (ref 31.4–37.4)
MCV RBC AUTO: 98 FL (ref 82–98)
MONOCYTES # BLD AUTO: 0.31 THOUSAND/ÂΜL (ref 0.17–1.22)
MONOCYTES NFR BLD AUTO: 5 % (ref 4–12)
NEUTROPHILS # BLD AUTO: 5.39 THOUSANDS/ÂΜL (ref 1.85–7.62)
NEUTS SEG NFR BLD AUTO: 88 % (ref 43–75)
NRBC BLD AUTO-RTO: 0 /100 WBCS
PLATELET # BLD AUTO: 143 THOUSANDS/UL (ref 149–390)
PMV BLD AUTO: 9.8 FL (ref 8.9–12.7)
POTASSIUM SERPL-SCNC: 4.1 MMOL/L (ref 3.5–5.3)
PROT SERPL-MCNC: 6.5 G/DL (ref 6.4–8.4)
PROTHROMBIN TIME: 13.6 SECONDS (ref 11.6–14.5)
RBC # BLD AUTO: 4.45 MILLION/UL (ref 3.88–5.62)
SODIUM SERPL-SCNC: 140 MMOL/L (ref 135–147)
WBC # BLD AUTO: 6.17 THOUSAND/UL (ref 4.31–10.16)

## 2023-01-13 RX ORDER — GUAIFENESIN 600 MG/1
600 TABLET, EXTENDED RELEASE ORAL EVERY 12 HOURS SCHEDULED
Status: DISCONTINUED | OUTPATIENT
Start: 2023-01-13 | End: 2023-01-16 | Stop reason: HOSPADM

## 2023-01-13 RX ORDER — LEVALBUTEROL INHALATION SOLUTION 0.63 MG/3ML
0.63 SOLUTION RESPIRATORY (INHALATION)
Status: DISCONTINUED | OUTPATIENT
Start: 2023-01-13 | End: 2023-01-16 | Stop reason: HOSPADM

## 2023-01-13 RX ORDER — HEPARIN SODIUM 10000 [USP'U]/100ML
3-20 INJECTION, SOLUTION INTRAVENOUS
Status: DISCONTINUED | OUTPATIENT
Start: 2023-01-13 | End: 2023-01-16 | Stop reason: HOSPADM

## 2023-01-13 RX ORDER — HEPARIN SODIUM 1000 [USP'U]/ML
4000 INJECTION, SOLUTION INTRAVENOUS; SUBCUTANEOUS EVERY 6 HOURS PRN
Status: DISCONTINUED | OUTPATIENT
Start: 2023-01-13 | End: 2023-01-16 | Stop reason: HOSPADM

## 2023-01-13 RX ORDER — HEPARIN SODIUM 1000 [USP'U]/ML
4000 INJECTION, SOLUTION INTRAVENOUS; SUBCUTANEOUS ONCE
Status: COMPLETED | OUTPATIENT
Start: 2023-01-13 | End: 2023-01-13

## 2023-01-13 RX ORDER — HEPARIN SODIUM 1000 [USP'U]/ML
2000 INJECTION, SOLUTION INTRAVENOUS; SUBCUTANEOUS EVERY 6 HOURS PRN
Status: DISCONTINUED | OUTPATIENT
Start: 2023-01-13 | End: 2023-01-16 | Stop reason: HOSPADM

## 2023-01-13 RX ADMIN — ALBUTEROL SULFATE 1 PUFF: 90 AEROSOL, METERED RESPIRATORY (INHALATION) at 12:30

## 2023-01-13 RX ADMIN — FLUTICASONE FUROATE AND VILANTEROL TRIFENATATE 1 PUFF: 200; 25 POWDER RESPIRATORY (INHALATION) at 09:56

## 2023-01-13 RX ADMIN — CARBIDOPA AND LEVODOPA 3 TABLET: 25; 100 TABLET ORAL at 14:18

## 2023-01-13 RX ADMIN — STANDARDIZED SENNA CONCENTRATE 25.8 MG: 8.6 TABLET ORAL at 09:56

## 2023-01-13 RX ADMIN — CLONAZEPAM 2 MG: 1 TABLET ORAL at 21:36

## 2023-01-13 RX ADMIN — CARBIDOPA AND LEVODOPA 3 TABLET: 25; 100 TABLET ORAL at 09:55

## 2023-01-13 RX ADMIN — ESCITALOPRAM OXALATE 20 MG: 20 TABLET ORAL at 09:56

## 2023-01-13 RX ADMIN — POLYETHYLENE GLYCOL 3350 17 G: 17 POWDER, FOR SOLUTION ORAL at 09:55

## 2023-01-13 RX ADMIN — REMDESIVIR 100 MG: 100 INJECTION, POWDER, LYOPHILIZED, FOR SOLUTION INTRAVENOUS at 19:30

## 2023-01-13 RX ADMIN — AMANTADINE HYDROCHLORIDE 100 MG: 100 CAPSULE ORAL at 14:20

## 2023-01-13 RX ADMIN — AMANTADINE HYDROCHLORIDE 100 MG: 100 CAPSULE ORAL at 09:55

## 2023-01-13 RX ADMIN — CARBIDOPA AND LEVODOPA 2 TABLET: 25; 100 TABLET ORAL at 21:36

## 2023-01-13 RX ADMIN — MEMANTINE 10 MG: 5 TABLET ORAL at 18:30

## 2023-01-13 RX ADMIN — METHYLPREDNISOLONE SODIUM SUCCINATE 40 MG: 40 INJECTION, POWDER, FOR SOLUTION INTRAMUSCULAR; INTRAVENOUS at 21:36

## 2023-01-13 RX ADMIN — PROPRANOLOL HYDROCHLORIDE 30 MG: 20 TABLET ORAL at 16:44

## 2023-01-13 RX ADMIN — METHYLPREDNISOLONE SODIUM SUCCINATE 40 MG: 40 INJECTION, POWDER, FOR SOLUTION INTRAMUSCULAR; INTRAVENOUS at 14:18

## 2023-01-13 RX ADMIN — LEVALBUTEROL HYDROCHLORIDE 0.63 MG: 0.63 SOLUTION RESPIRATORY (INHALATION) at 19:57

## 2023-01-13 RX ADMIN — Medication 9 MG: at 21:36

## 2023-01-13 RX ADMIN — PROPRANOLOL HYDROCHLORIDE 30 MG: 20 TABLET ORAL at 12:05

## 2023-01-13 RX ADMIN — UMECLIDINIUM 1 PUFF: 62.5 AEROSOL, POWDER ORAL at 09:56

## 2023-01-13 RX ADMIN — PRIMIDONE 50 MG: 50 TABLET ORAL at 21:36

## 2023-01-13 RX ADMIN — HEPARIN SODIUM 2000 UNITS: 1000 INJECTION INTRAVENOUS; SUBCUTANEOUS at 19:41

## 2023-01-13 RX ADMIN — CARBIDOPA AND LEVODOPA 3 TABLET: 25; 100 TABLET ORAL at 12:06

## 2023-01-13 RX ADMIN — MEMANTINE 10 MG: 5 TABLET ORAL at 09:55

## 2023-01-13 RX ADMIN — ENOXAPARIN SODIUM 40 MG: 100 INJECTION SUBCUTANEOUS at 09:55

## 2023-01-13 RX ADMIN — HEPARIN SODIUM 4000 UNITS: 1000 INJECTION INTRAVENOUS; SUBCUTANEOUS at 12:21

## 2023-01-13 RX ADMIN — OXYBUTYNIN CHLORIDE 10 MG: 5 TABLET, EXTENDED RELEASE ORAL at 18:30

## 2023-01-13 RX ADMIN — IPRATROPIUM BROMIDE 2 PUFF: 17 AEROSOL, METERED RESPIRATORY (INHALATION) at 21:37

## 2023-01-13 RX ADMIN — ZINC SULFATE 220 MG (50 MG) CAPSULE 220 MG: CAPSULE at 12:06

## 2023-01-13 RX ADMIN — CARBIDOPA AND LEVODOPA 2 TABLET: 25; 100 TABLET ORAL at 18:29

## 2023-01-13 RX ADMIN — LEVALBUTEROL HYDROCHLORIDE 0.63 MG: 0.63 SOLUTION RESPIRATORY (INHALATION) at 14:12

## 2023-01-13 RX ADMIN — GUAIFENESIN 600 MG: 600 TABLET ORAL at 21:36

## 2023-01-13 RX ADMIN — AZITHROMYCIN MONOHYDRATE 500 MG: 500 INJECTION, POWDER, LYOPHILIZED, FOR SOLUTION INTRAVENOUS at 12:06

## 2023-01-13 RX ADMIN — METHYLPREDNISOLONE SODIUM SUCCINATE 40 MG: 40 INJECTION, POWDER, FOR SOLUTION INTRAMUSCULAR; INTRAVENOUS at 05:19

## 2023-01-13 RX ADMIN — GUAIFENESIN 600 MG: 600 TABLET ORAL at 12:05

## 2023-01-13 RX ADMIN — HEPARIN SODIUM 11.1 UNITS/KG/HR: 10000 INJECTION, SOLUTION INTRAVENOUS at 12:21

## 2023-01-13 RX ADMIN — IPRATROPIUM BROMIDE 2 PUFF: 17 AEROSOL, METERED RESPIRATORY (INHALATION) at 18:58

## 2023-01-13 RX ADMIN — STANDARDIZED SENNA CONCENTRATE 25.8 MG: 8.6 TABLET ORAL at 21:36

## 2023-01-13 NOTE — ASSESSMENT & PLAN NOTE
· SpO2 88% on RA - improved to 98% on 2 L NC --> currently weaned to 1L (89% on RA today)  · Not on O2 at baseline  · CXR unremarkable   · Procalcitonin normal  · Suspect related COVID infection, COPD exac  · Wean O2 as able, see individual problems

## 2023-01-13 NOTE — ASSESSMENT & PLAN NOTE
· Follows with [facility] oncology  · Recently completed radiation treatment in Nov 2022  · Also with history of prostate cancer s/p radiation  · on Alfuzosin?  Not reordered on admission will discuss with patient

## 2023-01-13 NOTE — ASSESSMENT & PLAN NOTE
· History of tobacco use  · Not chronically on O2  · Exacerbation in setting of COVID infection  · Continue IV solumedrol TID today, transition to BID tomorrow   · scheduled neb treatments, respiratory protocol   · Zithromax x3 days

## 2023-01-13 NOTE — ASSESSMENT & PLAN NOTE
· Presented to the ED following syncopal episode    Last few days has been having cough/congestion, fever  · Tmax 102 7 - does not meet any other SIRS criteria  · COVID + 1/12/23  · Treat via mild pathway, requiring 1-2L   · Remdesivir initiated 1/12  · Solumedrol given instead of decadron  · No indication for Baricitinib   · D dimer elevated + O2 + age > 60--> AC with ACS (low) heparin gtt per protocol   · BC x 2 negative at 24 hr

## 2023-01-13 NOTE — PROGRESS NOTES
[place]  Progress Note Efra Meerditheimer 1950, 67 y o  male MRN: [id number]  Unit/Bed#: -01 Encounter: [id number]  Primary Care Provider: Niki Lamas DO   Date and time admitted to hospital: 1/12/2023  6:33 AM    * Hypoxia  Assessment & Plan  · SpO2 88% on RA - improved to 98% on 2 L NC --> currently weaned to 1L (89% on RA today)  · Not on O2 at baseline  · CXR unremarkable   · Procalcitonin normal  · Suspect related COVID infection, COPD exac  · Wean O2 as able, see individual problems     Chronic obstructive pulmonary disease with acute exacerbation (HCC)  Assessment & Plan  · History of tobacco use  · Not chronically on O2  · Exacerbation in setting of COVID infection  · Continue IV solumedrol TID today, transition to BID tomorrow   · scheduled neb treatments, respiratory protocol   · Zithromax x3 days    Syncope  Assessment & Plan  · Presented to the ED with witnessed syncopal episode at home, reportedly no head strike  · Suspect related to hypoxia/COVID infection, possible orthostatic hypotension  · Check orthostatic BP q shift  · Negative orthostatics on 1/12 after IVF, repeat today   · Per chart review of recent hematology note - pt had echocardiogram in October 2022 with EF 72% grade 1 diastolic dysfunction  No significant valvular disease  · Monitor on telemetry    COVID-19  Assessment & Plan  · Presented to the ED following syncopal episode    Last few days has been having cough/congestion, fever  · Tmax 102 7 - does not meet any other SIRS criteria  · COVID + 1/12/23  · Treat via mild pathway, requiring 1-2L   · Remdesivir initiated 1/12  · Solumedrol given instead of decadron  · No indication for Baricitinib   · D dimer elevated + O2 + age > 60--> AC with ACS (low) heparin gtt per protocol   · BC x 2 negative at 24 hr    Anxiety and depression  Assessment & Plan  · Continue lexapro  · Continue klonopin qhs  · Confirmed via PDMP - last refill Oct 2022 for 90 day supply    Elevated blood pressure reading  Assessment & Plan  · On arrival to the ED  · Improved without further intervention  · Continue to monitor per unit protocol  · Propanolol on home medications likely prescribed for tremor   · Given parkinson's need to monitor for orthostatic BP    Memory loss  Assessment & Plan  · monitor  · Continue memantine    Parkinson's disease (HCC)  Assessment & Plan  · Continue sinemet, primidone, amantadine   · Outpatient follow up with [facility]    History of lung cancer  Assessment & Plan  · Follows with [facility] oncology  · Recently completed radiation treatment in 2022  · Also with history of prostate cancer s/p radiation  · on Alfuzosin? Not reordered on admission will discuss with patient     Pancreatic cancer [facility])  Assessment & Plan  · Recently diagnosed, follows with [facility]  · Scheduled for whipple procedure 2023      VTE Pharmacologic Prophylaxis: VTE Score: 6 High Risk (Score >/= 5) - Pharmacological DVT Prophylaxis Ordered: heparin drip  Sequential Compression Devices Ordered  Patient Centered Rounds: I performed bedside rounds with nursing staff today  Discussions with Specialists or Other Care Team Provider: None     Education and Discussions with Family / Patient: Updated  (wife) at bedside  Time Spent for Care: 30 minutes  More than 50% of total time spent on counseling and coordination of care as described above  Current Length of Stay: 1 day(s)  Current Patient Status: Inpatient   Certification Statement: The patient will continue to require additional inpatient hospital stay due to IV steroids  Discharge Plan: Anticipate discharge in 24-48 hrs to discharge location to be determined pending rehab evaluations  Code Status: Level 1 - Full Code    Subjective:   Patient reports feeling slightly improved since yesterday, no CP or SOB today  Feels comfortable on RA, no temp today       Objective:     Vitals:   Temp (24hrs), Av 3 °F (36 8 °C), Min:98 °F (36 7 °C), Max:98 5 °F (36 9 °C)    Temp:  [98 °F (36 7 °C)-98 5 °F (36 9 °C)] 98 4 °F (36 9 °C)  HR:  [42-84] 42  Resp:  [16-24] 16  BP: (105-143)/(51-76) 134/56  SpO2:  [91 %-98 %] 91 %  Body mass index is 33 53 kg/m²  Input and Output Summary (last 24 hours): Intake/Output Summary (Last 24 hours) at 1/13/2023 1132  Last data filed at 1/13/2023 0301  Gross per 24 hour   Intake 360 ml   Output 565 ml   Net -205 ml       Physical Exam:   Physical Exam  Vitals and nursing note reviewed  Constitutional:       General: He is not in acute distress  Appearance: He is well-developed  He is ill-appearing  HENT:      Head: Normocephalic and atraumatic  Eyes:      General:         Right eye: No discharge  Left eye: No discharge  Conjunctiva/sclera: Conjunctivae normal    Cardiovascular:      Rate and Rhythm: Normal rate and regular rhythm  Heart sounds: No murmur heard  Pulmonary:      Effort: Pulmonary effort is normal  No respiratory distress  Breath sounds: Wheezing present  No rhonchi or rales  Comments: 88% RA, >91% on 1L  Exp wheezes throughout  Abdominal:      General: Bowel sounds are normal  There is no distension  Palpations: Abdomen is soft  Tenderness: There is no abdominal tenderness  Musculoskeletal:      Cervical back: Neck supple  Right lower leg: No edema  Left lower leg: No edema  Skin:     General: Skin is warm and dry  Capillary Refill: Capillary refill takes less than 2 seconds  Neurological:      Mental Status: He is alert and oriented to person, place, and time     Psychiatric:         Mood and Affect: Mood normal          Behavior: Behavior normal           Additional Data:     Labs:  Results from last 7 days   Lab Units 01/13/23  0305   WBC Thousand/uL 6 17   HEMOGLOBIN g/dL 13 6   HEMATOCRIT % 43 7   PLATELETS Thousands/uL 143*   NEUTROS PCT % 88*   LYMPHS PCT % 7*   MONOS PCT % 5   EOS PCT % 0 Results from last 7 days   Lab Units 01/13/23  0305   SODIUM mmol/L 140   POTASSIUM mmol/L 4 1   CHLORIDE mmol/L 108   CO2 mmol/L 28   BUN mg/dL 20   CREATININE mg/dL 0 91   ANION GAP mmol/L 4   CALCIUM mg/dL 8 7   ALBUMIN g/dL 2 9*   TOTAL BILIRUBIN mg/dL 0 40   ALK PHOS U/L 59   ALT U/L <6*   AST U/L 11   GLUCOSE RANDOM mg/dL 182*     Results from last 7 days   Lab Units 01/12/23  0646   INR  0 97             Results from last 7 days   Lab Units 01/12/23  0646   LACTIC ACID mmol/L 0 8   PROCALCITONIN ng/ml 0 10       Lines/Drains:  Invasive Devices     Peripheral Intravenous Line  Duration           Peripheral IV 01/12/23 Distal;Left;Upper;Ventral (anterior) Arm 1 day                      Imaging: Reviewed radiology reports from this admission including: chest xray    Recent Cultures (last 7 days):   Results from last 7 days   Lab Units 01/12/23  0646   BLOOD CULTURE  No Growth at 24 hrs  No Growth at 24 hrs         Last 24 Hours Medication List:   Current Facility-Administered Medications   Medication Dose Route Frequency Provider Last Rate   • acetaminophen  650 mg Oral Q6H PRN Manjeet Francis MD     • albuterol  1 puff Inhalation Q4H PRN Zenia Downs MD     • aluminum-magnesium hydroxide-simethicone  30 mL Oral Q6H PRN Zenia Downs MD     • amantadine  100 mg Oral BID Zenia Dwons MD     • azithromycin  500 mg Intravenous Q24H Zenia Downs MD     • carbidopa-levodopa  2 tablet Oral BID Zenia Downs MD     • carbidopa-levodopa  3 tablet Oral TID Jocelynn Francis MD     • clonazePAM  2 mg Oral HS Zenia Downs MD     • escitalopram  20 mg Oral Daily Zenia Downs MD     • fluticasone-vilanterol  1 puff Inhalation Daily Zenia Downs MD     • heparin (porcine)  3-20 Units/kg/hr (Order-Specific) Intravenous Titrated Sharon Bingham PA-C     • heparin (porcine)  2,000 Units Intravenous Q6H PRN Kady Guillen PA-C     • heparin (porcine)  4,000 Units Intravenous Once Reyes Enrique PA-C     • heparin (porcine)  4,000 Units Intravenous Q6H PRN Colleen Bingham PA-C     • melatonin  9 mg Oral HS Jeff Sánchez MD     • memantine  10 mg Oral BID Jeff Sánchez MD     • methylPREDNISolone sodium succinate  40 mg Intravenous Q8H Thad Cortes MD     • ondansetron  4 mg Intravenous Q6H PRN Jeff Sánchez MD     • oxybutynin  10 mg Oral Daily Jeff Sánchez MD     • polyethylene glycol  17 g Oral Daily Jeff Sánchez MD     • primidone  50 mg Oral HS Jeff Sánchez MD     • propranolol  30 mg Oral TID AC Jeff Sánchez MD     • remdesivir  100 mg Intravenous Q24H Jeff Sánchez MD     • senna  3 tablet Oral BID Jeff Sánchez MD     • sodium chloride  1,000 mL Intravenous Once Jeff Sánchez MD     • umeclidinium  1 puff Inhalation Daily Jeff Sánchez MD     • zinc sulfate  220 mg Oral Daily Jeff Sánchez MD          Today, Patient Was Seen By: Reyes Enrique PA-C    **Please Note: This note may have been constructed using a voice recognition system  **

## 2023-01-13 NOTE — PLAN OF CARE
Problem: PAIN - ADULT  Goal: Verbalizes/displays adequate comfort level or baseline comfort level  Description: Interventions:  - Encourage patient to monitor pain and request assistance  - Assess pain using appropriate pain scale  - Administer analgesics based on type and severity of pain and evaluate response  - Implement non-pharmacological measures as appropriate and evaluate response  - Consider cultural and social influences on pain and pain management  - Notify physician/advanced practitioner if interventions unsuccessful or patient reports new pain  Outcome: Progressing     Problem: INFECTION - ADULT  Goal: Absence or prevention of progression during hospitalization  Description: INTERVENTIONS:  - Assess and monitor for signs and symptoms of infection  - Monitor lab/diagnostic results  - Monitor all insertion sites, i e  indwelling lines, tubes, and drains  - Monitor endotracheal if appropriate and nasal secretions for changes in amount and color  - [place] appropriate cooling/warming therapies per order  - Administer medications as ordered  - Instruct and encourage patient and family to use good hand hygiene technique  - Identify and instruct in appropriate isolation precautions for identified infection/condition  Outcome: Progressing     Problem: SAFETY ADULT  Goal: Patient will remain free of falls  Description: INTERVENTIONS:  - Educate patient/family on patient safety including physical limitations  - Instruct patient to call for assistance with activity   - Consult OT/PT to assist with strengthening/mobility   - Keep Call bell within reach  - Keep bed low and locked with side rails adjusted as appropriate  - Keep care items and personal belongings within reach  - Initiate and maintain comfort rounds  - Make Fall Risk Sign visible to staff  - Offer Toileting every 2 Hours, in advance of need  - Initiate/Maintain bed alarm  - Obtain necessary fall risk management equipment: socks, alarm  - Apply yellow socks and bracelet for high fall risk patients  - Consider moving patient to room near nurses station  Outcome: Progressing  Goal: Maintain or return to baseline ADL function  Description: INTERVENTIONS:  -  Assess patient's ability to carry out ADLs; assess patient's baseline for ADL function and identify physical deficits which impact ability to perform ADLs (bathing, care of mouth/teeth, toileting, grooming, dressing, etc )  - Assess/evaluate cause of self-care deficits   - Assess range of motion  - Assess patient's mobility; develop plan if impaired  - Assess patient's need for assistive devices and provide as appropriate  - Encourage maximum independence but intervene and supervise when necessary  - Involve family in performance of ADLs  - Assess for home care needs following discharge   - Consider OT consult to assist with ADL evaluation and planning for discharge  - Provide patient education as appropriate  Outcome: Progressing  Goal: Maintains/Returns to pre admission functional level  Description: INTERVENTIONS:  - Perform BMAT or MOVE assessment daily    - Set and communicate daily mobility goal to care team and patient/family/caregiver  - Collaborate with rehabilitation services on mobility goals if consulted  - Perform Range of Motion 3 times a day  - Reposition patient every 2 hours    - Dangle patient 3 times a day  - Stand patient 3 times a day  - Ambulate patient 3 times a day  - Out of bed to chair 3 times a day   - Out of bed for meals 3 times a day  - Out of bed for toileting  - Record patient progress and toleration of activity level   Outcome: Progressing     Problem: DISCHARGE PLANNING  Goal: Discharge to home or other facility with appropriate resources  Description: INTERVENTIONS:  - Identify barriers to discharge w/patient and caregiver  - Arrange for needed discharge resources and transportation as appropriate  - Identify discharge learning needs (meds, wound care, etc )  - Arrange for interpretive services to assist at discharge as needed  - Refer to Case Management Department for coordinating discharge planning if the patient needs post-hospital services based on physician/advanced practitioner order or complex needs related to functional status, cognitive ability, or social support system  Outcome: Progressing     Problem: Knowledge Deficit  Goal: Patient/family/caregiver demonstrates understanding of disease process, treatment plan, medications, and discharge instructions  Description: Complete learning assessment and assess knowledge base  Interventions:  - Provide teaching at level of understanding  - Provide teaching via preferred learning methods  Outcome: Progressing     Problem: MOBILITY - ADULT  Goal: Maintain or return to baseline ADL function  Description: INTERVENTIONS:  -  Assess patient's ability to carry out ADLs; assess patient's baseline for ADL function and identify physical deficits which impact ability to perform ADLs (bathing, care of mouth/teeth, toileting, grooming, dressing, etc )  - Assess/evaluate cause of self-care deficits   - Assess range of motion  - Assess patient's mobility; develop plan if impaired  - Assess patient's need for assistive devices and provide as appropriate  - Encourage maximum independence but intervene and supervise when necessary  - Involve family in performance of ADLs  - Assess for home care needs following discharge   - Consider OT consult to assist with ADL evaluation and planning for discharge  - Provide patient education as appropriate  Outcome: Progressing  Goal: Maintains/Returns to pre admission functional level  Description: INTERVENTIONS:  - Perform BMAT or MOVE assessment daily    - Set and communicate daily mobility goal to care team and patient/family/caregiver  - Collaborate with rehabilitation services on mobility goals if consulted  - Perform Range of Motion 3 times a day  - Reposition patient every 2 hours    - Dangle patient 3 times a day  - Stand patient 3 times a day  - Ambulate patient 3 times a day  - Out of bed to chair 3 times a day   - Out of bed for meals 3 times a day  - Out of bed for toileting  - Record patient progress and toleration of activity level   Outcome: Progressing     Problem: Potential for Falls  Goal: Patient will remain free of falls  Description: INTERVENTIONS:  - Educate patient/family on patient safety including physical limitations  - Instruct patient to call for assistance with activity   - Consult OT/PT to assist with strengthening/mobility   - Keep Call bell within reach  - Keep bed low and locked with side rails adjusted as appropriate  - Keep care items and personal belongings within reach  - Initiate and maintain comfort rounds  - Make Fall Risk Sign visible to staff  - Offer Toileting every 2 Hours, in advance of need  - Initiate/Maintain bed alarm  - Obtain necessary fall risk management equipment: socks, alarm  - Apply yellow socks and bracelet for high fall risk patients  - Consider moving patient to room near nurses station  Outcome: Progressing

## 2023-01-13 NOTE — ASSESSMENT & PLAN NOTE
· Continue sinemet, primidone, amantadine   · Outpatient follow up with [facility]rology 05-Jan-2020 01:50

## 2023-01-13 NOTE — CASE MANAGEMENT
Case Management Assessment & Discharge Planning Note    Patient name Taiwo Mckeon  Location [address] [id number]/-[id number] MRN [id number]  : 1950 Date 2023       Current Admission Date: 2023  Current Admission Diagnosis:Hypoxia   Patient Active Problem List    Diagnosis Date Noted   • Hypoxia 2023   • COVID-19 2023   • Syncope 2023   • Chronic obstructive pulmonary disease with acute exacerbation ([facility] ) 2023   • Pancreatic cancer ([facility] ) 2023   • History of prostate cancer 2023   • History of lung cancer 2023   • Parkinson's disease ([facility] ) 2023   • Memory loss 2023   • Elevated blood pressure reading 2023   • Anxiety and depression 2023      LOS (days): 1  Geometric Mean LOS (GMLOS) (days): 3 60  Days to GMLOS:2 3     OBJECTIVE:    Risk of Unplanned Readmission Score: 15 62         Current admission status: Inpatient       [facility] [address] [place] [id number]  Phone: [phone] Fax: [phone]    Primary Care Provider: Lorelei Harrell DO    Primary Insurance: MEDICARE  Secondary Insurance: [facility]    ASSESSMENT:  Active Health Care Proxies     Formerly [facility] Representative - Spouse   Primary Phone: [phone] (Mobile)               Advance Directives  Does patient have a [address] [place] [address] Avenue?: No  Was patient offered paperwork?: Yes (declined)  Does patient currently have a Health Care decision maker?: Yes, please see Health Care Proxy section  Does patient have Advance Directives?: No  Was patient offered paperwork?: Yes (declined)    Readmission Root Cause  30 Day Readmission: No    Patient Information  Admitted from[de-identified] Home  Mental Status: Alert  During Assessment patient was accompanied by: Not accompanied during assessment  Assessment information provided by[de-identified] Patient  Primary Caregiver: Self  Support Systems: Spouse/significant other  Home entry access options   Select all that apply : Stairs  Number of steps to enter home : 2  Type of Current Residence: 2 story home  Upon entering residence, is there a bedroom on the main floor (no further steps)?: Yes  Upon entering residence, is there a bathroom on the main floor (no further steps)?: Yes  In the last 12 months, was there a time when you were not able to pay the mortgage or rent on time?: No  In the last 12 months, how many places have you lived?: 1  In the last 12 months, was there a time when you did not have a steady place to sleep or slept in a shelter (including now)?: No  Homeless/housing insecurity resource given?: N/A  Living Arrangements: Lives w/ Spouse/significant other    Activities of Daily Living Prior to Admission  Functional Status: Independent  Completes ADLs independently?: Yes  Ambulates independently?: Yes  Does patient use assisted devices?: No  Does patient currently own DME?: No  Does patient have a history of Outpatient Therapy (PT/OT)?: No  Does the patient have a history of Short-Term Rehab?: No  Does patient have a history of HHC?: No  Does patient currently have [facility]?: No    Patient Information Continued  Does patient have prescription coverage?: Yes  Within the past 12 months, you worried that your food would run out before you got the money to buy more : Never true  Within the past 12 months, the food you bought just didn't last and you didn't have money to get more : Never true  Food insecurity resource given?: N/A  Does patient receive dialysis treatments?: No  Does patient have a history of substance abuse?: No  Does patient have a history of Mental Health Diagnosis?: No    Means of Transportation  [facility] Family transport  In the past 12 months, has lack of transportation kept you from medical appointments or from getting medications?: No  In the past 12 months, has lack of transportation kept you from meetings, work, or from getting things needed for daily living?: No  Was application for public transport provided?: N/A    DISCHARGE DETAILS:    Discharge planning discussed with[de-identified] patient and wife (Itzel Neri)  Freedom of Choice: Yes  Comments - Freedom of Choice: no anticipated dc needs  CM contacted family/caregiver?: Yes  Were Treatment Team discharge recommendations reviewed with patient/caregiver?: Yes  Did patient/caregiver verbalize understanding of patient care needs?: Yes  Were patient/caregiver advised of the risks associated with not following Treatment Team discharge recommendations?: Yes    Contacts  Patient Contacts: Humphrey Sung (wife)  Relationship to Patient[de-identified] Family  Contact Method: In Person  Reason/Outcome: Discharge [address]         Is the patient interested in [address] at discharge?: No    DME Referral Provided  Referral made for DME?: No    Treatment Team Recommendation: Home  Discharge Destination Plan[de-identified] Home  Transport at Discharge : Family        IMM Given (Date):: 01/13/23  IMM Given to[de-identified] Patient     Additional Comments: Met with pt to discuss the role of CM and to discuss any help pt may need prior to dc  Pt lives with his wife Humphrey Sung in a 2 story home with a 1st floor setup; 6 ADINA  Pt erformed ADL's indptly ptas, no use of DME  No hx of HHC or rehab  No hx of mental health or D&A treatment  Pt's preferred pharmacy is [facility] in [place]  Pt's wife provides transport

## 2023-01-13 NOTE — RESPIRATORY THERAPY NOTE
RT Protocol Note  Forrest Duffy 67 y o  male MRN: [id number]  Unit/Bed#: -01 Encounter: [id number]    Assessment    Principal Problem:    Hypoxia  Active Problems:    COVID-19    Syncope    Chronic obstructive pulmonary disease with acute exacerbation (HCC)    Pancreatic cancer ([place] [address] )    History of lung cancer    Parkinson's disease ([place] [address] )    Memory loss    Elevated blood pressure reading    Anxiety and depression      Home Pulmonary Medications:  Yes prn       Past Medical History:   Diagnosis Date    COPD (chronic obstructive pulmonary disease) ([place] [address] )     Pancreatic cancer ([place] [address] )     Parkinson's disease ([place] [address] )      Social History     Socioeconomic History    Marital status: /Civil Union     Spouse name: None    Number of children: None    Years of education: None    Highest education level: None   Occupational History    None   Tobacco Use    Smoking status: Never    Smokeless tobacco: None   Substance and Sexual Activity    Alcohol use: Yes     Alcohol/week: 2 0 standard drinks     Types: 2 Glasses of wine per week    Drug use: Never    Sexual activity: Never   Other Topics Concern    None   Social History Narrative    None     Social Determinants of Health     Financial Resource Strain: Not on file   Food Insecurity: Not on file   Transportation Needs: Not on file   Physical Activity: Not on file   Stress: Not on file   Social Connections: Not on file   Intimate Partner Violence: Not on file   Housing Stability: Not on file       Subjective         Objective    Physical Exam:   Assessment Type: Assess only  General Appearance: Alert, Awake  Respiratory Pattern: Normal  Bilateral Breath Sounds: Diminished, Expiratory wheezes  Cough: Productive    Vitals:  Blood pressure 134/56, pulse (!) 42, temperature 98 4 °F (36 9 °C), resp  rate 16, height 5' 9" (1 753 m), weight 103 kg (227 lb 1 2 oz), SpO2 96 %  Imaging and other studies: I have personally reviewed pertinent reports  Plan    Respiratory Plan: Home Bronchodilator Patient pathway

## 2023-01-13 NOTE — ASSESSMENT & PLAN NOTE
· Presented to the ED with witnessed syncopal episode at home, reportedly no head strike  · Suspect related to hypoxia/COVID infection, possible orthostatic hypotension  · Check orthostatic BP q shift  · Negative orthostatics on 1/12 after IVF, repeat today   · Per chart review of recent hematology note - pt had echocardiogram in October 2022 with EF 12% grade 1 diastolic dysfunction    No significant valvular disease  · Monitor on telemetry

## 2023-01-14 LAB
ANION GAP SERPL CALCULATED.3IONS-SCNC: 8 MMOL/L (ref 4–13)
APTT PPP: 64 SECONDS (ref 23–37)
APTT PPP: 75 SECONDS (ref 23–37)
ATRIAL RATE: 59 BPM
BASOPHILS # BLD MANUAL: 0 THOUSAND/UL (ref 0–0.1)
BASOPHILS NFR MAR MANUAL: 0 % (ref 0–1)
BUN SERPL-MCNC: 27 MG/DL (ref 5–25)
CALCIUM SERPL-MCNC: 8.4 MG/DL (ref 8.3–10.1)
CHLORIDE SERPL-SCNC: 108 MMOL/L (ref 96–108)
CO2 SERPL-SCNC: 28 MMOL/L (ref 21–32)
CREAT SERPL-MCNC: 0.9 MG/DL (ref 0.6–1.3)
EOSINOPHIL # BLD MANUAL: 0 THOUSAND/UL (ref 0–0.4)
EOSINOPHIL NFR BLD MANUAL: 0 % (ref 0–6)
ERYTHROCYTE [DISTWIDTH] IN BLOOD BY AUTOMATED COUNT: 13.3 % (ref 11.6–15.1)
GFR SERPL CREATININE-BSD FRML MDRD: 84 ML/MIN/1.73SQ M
GLUCOSE SERPL-MCNC: 179 MG/DL (ref 65–140)
HCT VFR BLD AUTO: 43.5 % (ref 36.5–49.3)
HGB BLD-MCNC: 13.6 G/DL (ref 12–17)
LYMPHOCYTES # BLD AUTO: 0.55 THOUSAND/UL (ref 0.6–4.47)
LYMPHOCYTES # BLD AUTO: 5 % (ref 14–44)
MCH RBC QN AUTO: 31.1 PG (ref 26.8–34.3)
MCHC RBC AUTO-ENTMCNC: 31.3 G/DL (ref 31.4–37.4)
MCV RBC AUTO: 100 FL (ref 82–98)
MONOCYTES # BLD AUTO: 0.22 THOUSAND/UL (ref 0–1.22)
MONOCYTES NFR BLD: 2 % (ref 4–12)
NEUTROPHILS # BLD MANUAL: 10.27 THOUSAND/UL (ref 1.85–7.62)
NEUTS SEG NFR BLD AUTO: 93 % (ref 43–75)
P AXIS: 8 DEGREES
PLATELET # BLD AUTO: 147 THOUSANDS/UL (ref 149–390)
PLATELET BLD QL SMEAR: ADEQUATE
PMV BLD AUTO: 9.6 FL (ref 8.9–12.7)
POTASSIUM SERPL-SCNC: 4.3 MMOL/L (ref 3.5–5.3)
PR INTERVAL: 166 MS
QRS AXIS: 28 DEGREES
QRSD INTERVAL: 98 MS
QT INTERVAL: 396 MS
QTC INTERVAL: 392 MS
RBC # BLD AUTO: 4.37 MILLION/UL (ref 3.88–5.62)
RBC MORPH BLD: NORMAL
SODIUM SERPL-SCNC: 144 MMOL/L (ref 135–147)
T WAVE AXIS: 16 DEGREES
VENTRICULAR RATE: 59 BPM
WBC # BLD AUTO: 11.04 THOUSAND/UL (ref 4.31–10.16)

## 2023-01-14 RX ORDER — METHYLPREDNISOLONE SODIUM SUCCINATE 40 MG/ML
40 INJECTION, POWDER, LYOPHILIZED, FOR SOLUTION INTRAMUSCULAR; INTRAVENOUS EVERY 12 HOURS SCHEDULED
Status: DISCONTINUED | OUTPATIENT
Start: 2023-01-14 | End: 2023-01-15

## 2023-01-14 RX ORDER — PROPRANOLOL HYDROCHLORIDE 20 MG/1
20 TABLET ORAL
Status: DISCONTINUED | OUTPATIENT
Start: 2023-01-14 | End: 2023-01-15

## 2023-01-14 RX ADMIN — FLUTICASONE FUROATE AND VILANTEROL TRIFENATATE 1 PUFF: 200; 25 POWDER RESPIRATORY (INHALATION) at 08:11

## 2023-01-14 RX ADMIN — REMDESIVIR 100 MG: 100 INJECTION, POWDER, LYOPHILIZED, FOR SOLUTION INTRAVENOUS at 17:03

## 2023-01-14 RX ADMIN — ZINC SULFATE 220 MG (50 MG) CAPSULE 220 MG: CAPSULE at 12:00

## 2023-01-14 RX ADMIN — IPRATROPIUM BROMIDE 2 PUFF: 17 AEROSOL, METERED RESPIRATORY (INHALATION) at 23:31

## 2023-01-14 RX ADMIN — AMANTADINE HYDROCHLORIDE 100 MG: 100 CAPSULE ORAL at 08:11

## 2023-01-14 RX ADMIN — CLONAZEPAM 2 MG: 1 TABLET ORAL at 23:23

## 2023-01-14 RX ADMIN — LEVALBUTEROL HYDROCHLORIDE 0.63 MG: 0.63 SOLUTION RESPIRATORY (INHALATION) at 13:37

## 2023-01-14 RX ADMIN — OXYBUTYNIN CHLORIDE 10 MG: 5 TABLET, EXTENDED RELEASE ORAL at 17:06

## 2023-01-14 RX ADMIN — GUAIFENESIN 600 MG: 600 TABLET ORAL at 08:11

## 2023-01-14 RX ADMIN — PRIMIDONE 50 MG: 50 TABLET ORAL at 23:34

## 2023-01-14 RX ADMIN — LEVALBUTEROL HYDROCHLORIDE 0.63 MG: 0.63 SOLUTION RESPIRATORY (INHALATION) at 07:29

## 2023-01-14 RX ADMIN — METHYLPREDNISOLONE SODIUM SUCCINATE 40 MG: 40 INJECTION, POWDER, FOR SOLUTION INTRAMUSCULAR; INTRAVENOUS at 06:23

## 2023-01-14 RX ADMIN — HEPARIN SODIUM 13.11 UNITS/KG/HR: 10000 INJECTION, SOLUTION INTRAVENOUS at 07:20

## 2023-01-14 RX ADMIN — ESCITALOPRAM OXALATE 20 MG: 20 TABLET ORAL at 08:11

## 2023-01-14 RX ADMIN — LEVALBUTEROL HYDROCHLORIDE 0.63 MG: 0.63 SOLUTION RESPIRATORY (INHALATION) at 20:25

## 2023-01-14 RX ADMIN — MEMANTINE 10 MG: 5 TABLET ORAL at 17:06

## 2023-01-14 RX ADMIN — IPRATROPIUM BROMIDE 2 PUFF: 17 AEROSOL, METERED RESPIRATORY (INHALATION) at 11:59

## 2023-01-14 RX ADMIN — IPRATROPIUM BROMIDE 2 PUFF: 17 AEROSOL, METERED RESPIRATORY (INHALATION) at 08:11

## 2023-01-14 RX ADMIN — PROPRANOLOL HYDROCHLORIDE 20 MG: 20 TABLET ORAL at 16:59

## 2023-01-14 RX ADMIN — IPRATROPIUM BROMIDE 2 PUFF: 17 AEROSOL, METERED RESPIRATORY (INHALATION) at 17:08

## 2023-01-14 RX ADMIN — AZITHROMYCIN MONOHYDRATE 500 MG: 500 INJECTION, POWDER, LYOPHILIZED, FOR SOLUTION INTRAVENOUS at 12:00

## 2023-01-14 RX ADMIN — POLYETHYLENE GLYCOL 3350 17 G: 17 POWDER, FOR SOLUTION ORAL at 08:11

## 2023-01-14 RX ADMIN — PROPRANOLOL HYDROCHLORIDE 20 MG: 20 TABLET ORAL at 11:58

## 2023-01-14 RX ADMIN — CARBIDOPA AND LEVODOPA 2 TABLET: 25; 100 TABLET ORAL at 17:06

## 2023-01-14 RX ADMIN — CARBIDOPA AND LEVODOPA 2 TABLET: 25; 100 TABLET ORAL at 23:23

## 2023-01-14 RX ADMIN — CARBIDOPA AND LEVODOPA 3 TABLET: 25; 100 TABLET ORAL at 08:11

## 2023-01-14 RX ADMIN — AMANTADINE HYDROCHLORIDE 100 MG: 100 CAPSULE ORAL at 14:29

## 2023-01-14 RX ADMIN — MEMANTINE 10 MG: 5 TABLET ORAL at 08:11

## 2023-01-14 RX ADMIN — CARBIDOPA AND LEVODOPA 3 TABLET: 25; 100 TABLET ORAL at 14:29

## 2023-01-14 RX ADMIN — CARBIDOPA AND LEVODOPA 3 TABLET: 25; 100 TABLET ORAL at 12:00

## 2023-01-14 RX ADMIN — METHYLPREDNISOLONE SODIUM SUCCINATE 40 MG: 40 INJECTION, POWDER, FOR SOLUTION INTRAMUSCULAR; INTRAVENOUS at 23:25

## 2023-01-14 RX ADMIN — STANDARDIZED SENNA CONCENTRATE 25.8 MG: 8.6 TABLET ORAL at 08:11

## 2023-01-14 RX ADMIN — GUAIFENESIN 600 MG: 600 TABLET ORAL at 23:24

## 2023-01-14 RX ADMIN — Medication 9 MG: at 23:24

## 2023-01-14 NOTE — ASSESSMENT & PLAN NOTE
· SpO2 88% on RA - improved to 98% on 2 L NC --> currently weaned to RA (89-95% on RA today)  Intermittently needing 1-2L     · Not on O2 at baseline  · CXR unremarkable   · Procalcitonin normal  · Suspect related COVID infection, COPD exac  · Wean O2 as able, see individual problems   · Home O2 eval 1/15 -> requiring 2L at rest and with exertion   · Home O2 ordered per CM, expect to go home in am

## 2023-01-14 NOTE — ASSESSMENT & PLAN NOTE
· On arrival to the ED  · Improved without further intervention  · Continue to monitor per unit protocol  · Propanolol on home medications prescribed for tremor --> reducing dose   · Given parkinson's need to monitor for orthostatic BP

## 2023-01-14 NOTE — PROGRESS NOTES
-- Patient:  -- MRN: [id number]  -- Aidin Request ID: [id number]  -- Level of care reserved: [facility]  -- Partner Reserved: 168 [facility], [address] [place] [address] [phone]  -- Clinical needs requested:  -- Geography searched: [phone]  -- Start of Service:  -- Request sent: 12:02pm EST on 1/14/2023 by Blanca Gupta  -- Partner reserved: 2:29pm EST on 1/14/2023 by Blanca Gupta  -- Choice list shared: 2:28pm EST on 1/14/2023 by Blanca Gupta

## 2023-01-14 NOTE — ASSESSMENT & PLAN NOTE
· Continue sinemet, primidone, amantadine   · Outpatient follow up with [facility]

## 2023-01-14 NOTE — ASSESSMENT & PLAN NOTE
· Presented to the ED following syncopal episode    Last few days has been having cough/congestion, fever  · Possible evolving covid PNA, see below   · Tmax 102 7 - does not meet any other SIRS criteria  · COVID + 1/12/23  · Treat via mild pathway, requiring 1-2L   · Remdesivir initiated 1/12  · Solumedrol given instead of decadron  · No indication for Baricitinib   · D dimer elevated + O2 + age > 60--> AC with ACS (low) heparin gtt per protocol   · BC x 2 negative at 48 hr

## 2023-01-14 NOTE — ASSESSMENT & PLAN NOTE
· Presented to the ED following syncopal episode    Last few days has been having cough/congestion, fever  · Possible evolving covid PNA, see below   · Tmax 102 7 - does not meet any other SIRS criteria  · COVID + 1/12/23  · Treat via mild pathway, requiring 1-2L   · Remdesivir initiated 1/12  · Solumedrol given instead of decadron  · No indication for Baricitinib   · D dimer elevated + O2 + age > 60--> AC with ACS (low) heparin gtt per protocol   · BC x 2 negative at 72 hr

## 2023-01-14 NOTE — PLAN OF CARE
Problem: PAIN - ADULT  Goal: Verbalizes/displays adequate comfort level or baseline comfort level  Description: Interventions:  - Encourage patient to monitor pain and request assistance  - Assess pain using appropriate pain scale  - Administer analgesics based on type and severity of pain and evaluate response  - Implement non-pharmacological measures as appropriate and evaluate response  - Consider cultural and social influences on pain and pain management  - Notify physician/advanced practitioner if interventions unsuccessful or patient reports new pain  Outcome: Progressing     Problem: INFECTION - ADULT  Goal: Absence or prevention of progression during hospitalization  Description: INTERVENTIONS:  - Assess and monitor for signs and symptoms of infection  - Monitor lab/diagnostic results  - Monitor all insertion sites, i e  indwelling lines, tubes, and drains  - Monitor endotracheal if appropriate and nasal secretions for changes in amount and color  - [place] appropriate cooling/warming therapies per order  - Administer medications as ordered  - Instruct and encourage patient and family to use good hand hygiene technique  - Identify and instruct in appropriate isolation precautions for identified infection/condition  Outcome: Progressing

## 2023-01-14 NOTE — ASSESSMENT & PLAN NOTE
· Follows with [facility] oncology  · Recently completed radiation treatment in Nov 2022  · Also with history of prostate cancer s/p radiation

## 2023-01-14 NOTE — OCCUPATIONAL THERAPY NOTE
Occupational Therapy Evaluation ([phone]) & Tx ([phone])     Patient Name: Forrest Duffy  PHTPF'B Date: 1/14/2023  Problem List  Principal Problem:    Hypoxia  Active Problems:    COVID-19    Syncope    Chronic obstructive pulmonary disease with acute exacerbation (HCC)    Pancreatic cancer ([facility] )    History of lung cancer    Parkinson's disease ([facility] )    Memory loss    Elevated blood pressure reading    Anxiety and depression    Past Medical History  Past Medical History:   Diagnosis Date   • COPD (chronic obstructive pulmonary disease) ([facility] )    • Pancreatic cancer ([facility] )    • Parkinson's disease ([place] [id number] )      Past Surgical History  History reviewed  No pertinent surgical history  01/14/23 0919   OT Last Visit   OT Visit Date 01/14/23   Note Type   Note type Evaluation   Pain Assessment   Pain Assessment Tool 0-10   Pain Score No Pain   Restrictions/Precautions   Weight Bearing Precautions Per Order No   Other Precautions Contact/isolation; Airborne/isolation; Fall Risk;O2  (Parkinson's Disease  2L - does not wear at home)   Home Living   Type of [address] [place] [address] Two level;Performs ADLs on one level  (Bi-level - Kitchen, BR, DR, Bedroom on one level  Can go through back door with 0 steps  5 steps to living level)   Bathroom Shower/Tub Tub/shower unit   Bathroom Toilet Raised   Home Equipment Walker;Cane;Wheelchair-manual   Additional Comments Does not use DME at baseline   Prior Function   Level of [place] Independent with ADLs; Independent with functional mobility; Needs assistance with IADLS   Lives With Spouse   Receives Help From Family   IADLs Family/Friend/Other provides transportation; Family/Friend/Other provides meals; Family/Friend/Other provides medication management   Falls in the last 6 months 0   Vocational Retired   Lifestyle   Autonomy Independent in ADLs & functional mobility   Reciprocal Relationships Lives with wife who [facility] except 1 day/wk where she has to go office   Service to Others Retired   General   Family/Caregiver Present No   Subjective   Subjective "I want to get stronger"   ADL   Eating Assistance [address] Deficit   (Pt states)   Toileting Assistance  5  Supervision/Setup   Additional Comments Pt states wife helps with socks  See tx session for additonal LB dressing   Bed Mobility   Supine to Sit   (Pt received sitting EOB)   Transfers   Sit to Stand 5  Supervision   Additional items Verbal cues;Armrests; Impulsive  (Mildly impulsive - eager to participate  VC's for hand placement)   Stand to Sit 5  Supervision   Additional items Verbal cues; Impulsive;Armrests  (Pt mildly impulsive - eager to participate)   Additional Comments 3 STS performed   Functional Mobility   Additional Comments See tx session for mobility   Balance   Static Sitting Good   Dynamic Sitting Good   Static Standing Fair +   Dynamic Standing Fair +   Ambulatory Fair +   Activity Tolerance   Activity Tolerance Patient tolerated treatment well   Nurse Made Aware ADALBERTO Farr   RUKIRTSIN Assessment   RUE Assessment WFL   LUE Assessment   LUE Assessment WFL   Hand Function   Gross Motor Coordination Functional  (Tremulous 2* Parkinson's)   Vision-Basic Assessment   Current Vision Wears glasses all the time  (Bifocals)   Cognition   Overall Cognitive Status WFL   Arousal/Participation Alert   Attention Within functional limits   Orientation Level Oriented X4   Memory Within functional limits;Decreased recall of recent events   Following Commands Follows all commands and directions without difficulty   Assessment   Limitation Decreased ADL status; Decreased self-care trans;Decreased high-level ADLs; Decreased Safe judgement during ADL   Prognosis Good   Assessment Pt is a 67 y o  male seen for OT evaluation at [facility], admitted 1/12/2023 w/ Hypoxia  OT completed extensive review of pt's medical and social history  Comorbidities affecting pt's functional performance at time of assessment include: covid-19, syncope, COPD with acute exacerbation, hx of pancreatic, prostate, & lung CA, Parkinson's Disease, Memory loss, anxiety & depression  Personal factors affecting pt at time of IE include: steps to enter environment, difficulty performing ADLS, difficulty performing IADLS , compliance, health management  and environment  Prior to admission, pt was living with his wife in a bi-level home with 0 ADINA & 5 steps to living area  Pt was I w/  ADLS and A w/ IADLS, (-) drove, & required use of no DME/AD PTA  Upon evaluation: Pt requires S for functional mobility/transfers, S for UB ADLs and Min A for LB ADLS 2* the following deficits impacting occupational performance: weakness, decreased balance, impaired GMC, impulsivity and decreased safety awareness  Full objective findings from OT assessment regarding body systems outlined above  Pt to benefit from continued skilled OT tx while in the hospital to address deficits as defined above and maximize level of functional independence w/ ADL's and functional mobility  Occupational Performance areas to address include: bathing/shower, toilet hygiene, dressing, health maintenance, functional mobility, community mobility and clothing management  Based on findings, pt is of high complexity  The patient's raw score on the AM-PAC Daily Activity inpatient short form is 22, standardized score is 47 1, greater than 39 4  Patients at this level are likely to benefit from DC to home, which does coincide with current above OT recommendations  However, please refer to therapist recommendation for discharge planning given other factors that may influence destination  At this time, OT recommendations at time of discharge are home OT     Goals   Patient Goals Pt wants to get stronger in BLE & improve his balance   Plan   Treatment Interventions ADL retraining;Functional transfer training;UE strengthening/ROM; Endurance training;Patient/family training;Equipment evaluation/education; Compensatory technique education;Continued evaluation   Goal Expiration Date 01/24/23   OT Treatment Day 0   OT Frequency 2-3x/wk   Recommendation   OT Discharge Recommendation Home with home health rehabilitation   Equipment Recommended Shower/Tub chair with back ($)   AM-PAC Daily Activity Inpatient   Lower Body Dressing 3   Bathing 3   Toileting 4   Upper Body Dressing 4   Grooming 4   Eating 4   Daily Activity Raw Score 22   Daily Activity Standardized Score (Calc for Raw Score >=11) 47  1   AM-PAC Applied Cognition Inpatient   Following a Speech/Presentation 4   Understanding Ordinary Conversation 4   Taking Medications 4   Remembering Where Things Are Placed or Put Away 4   Remembering List of 4-5 Errands 3   Taking Care of Complicated Tasks 3   Applied Cognition Raw Score 22   Applied Cognition Standardized Score 47 83   Additional Treatment Session   Start Time 0935   End Time 1421   Treatment Assessment Pt performed functional mobility for a total of 150 ft (50 ft x3) with supervision  Pt completed mobility with & without a RW as pt does not use one at baseline  Balance without RW was Fair (+), with RW balance was Good  Edcuated pt he would benefit from use of RW for mobility  Pt then able to demonstrate ability to don shoes independently  Pt wears [employer] brand sneakers which allow pt to slide feet in without heel folding inward  Pt educated on Michelle [address] OT recommendation, pt & wife agreeable  Pt left seated on recliner with chair alarm on, all needs in reach  Encourage pt be OOB for all meals, <> BR for all toileting with staff  RN made aware     End of Consult   Education Provided Yes;Family or social support of family present for education by provider   Patient Position at End of Consult Bedside chair;Bed/Chair alarm activated; All needs within reach   Nurse Communication Nurse aware of consult     Pt will achieve the following goals within 10 days  *Pt will complete LB bathing and dressing with Mod I & DME PRN  *Pt will perform functional transfers with on/off all surfaces with Mod I using DME as needed w/ G balance/safety  *Pt will complete item retrieval and light home management with S while demonstrating good safety  *Pt will independently identify 3-5 fall risks during ADL routine to ensure home safety upon discharge  *Pt will improve functional mobility during ADL/IADL/leisure tasks to Mod I using DME as needed w/ G balance/safety        *Pt will demonstrate G carryover of pt/caregiver education and training as appropriate w/ Mod I w/o cues w/ good tolerance      *Assess DME needs     Ayo Gold OTR/L

## 2023-01-14 NOTE — ASSESSMENT & PLAN NOTE
· Continue sinemet, primidone, amantadine   · Outpatient follow up with [facility]rology

## 2023-01-14 NOTE — ASSESSMENT & PLAN NOTE
· SpO2 88% on RA - improved to 98% on 2 L NC --> currently weaned to RA (89-95% on RA today)  · Not on O2 at baseline  · CXR unremarkable   · Procalcitonin normal  · Suspect related COVID infection, COPD exac  · Wean O2 as able, see individual problems   · Home O2 eval tomorrow

## 2023-01-14 NOTE — ASSESSMENT & PLAN NOTE
· On arrival to the ED  · Improved without further intervention  · Continue to monitor per unit protocol  · Propanolol on home medications prescribed for tremor --> DC'd  · Given parkinson's need to monitor for orthostatic BP

## 2023-01-14 NOTE — ASSESSMENT & PLAN NOTE
· Follows with [facility] oncology  · Recently completed radiation treatment in Nov 2022  · Also with history of prostate cancer s/p radiation

## 2023-01-14 NOTE — ASSESSMENT & PLAN NOTE
· Presented to the ED with witnessed syncopal episode at home, reportedly no head strike  · Check orthostatic BP q shift  · Negative orthostatics on 1/12 after IVF, repeat today   · Echo 10/2022: EF 32% grade 1 diastolic dysfunction  No significant valvular disease  · Suspect related to COVID infection + underlying orthostatic hypotension vs bradycardia  · Patient has been on 30 mg propranolol TID for parkinson's/tremor  Wife notes he has had issues with low HR and syncope in the past requiring dose reduction     · Asymptomatic bradycardia today 1/14 with HR 43 in early am --> increased to 70-80s after am propranolol was held   · Will reduce propranolol to 20 mg TID and monitor   · EKG- sinus bradycardia w sinus arrhythmia  · Monitor telemetry

## 2023-01-14 NOTE — ASSESSMENT & PLAN NOTE
· Presented to the ED with witnessed syncopal episode at home, reportedly no head strike  · orthostatic BP negative x 2  · Negative orthostatics on 1/12 after IVF, repeat today   · Echo 10/2022: EF 55% grade 1 diastolic dysfunction  No significant valvular disease  · Suspect related to COVID infection + underlying orthostatic hypotension vs bradycardia  · Patient has been on 30 mg propranolol TID for parkinson's/tremor  Wife notes he has had issues with low HR and syncope in the past requiring dose reduction     · Asymptomatic bradycardia 1/14 -1/15 with HR 30-40s in early am despite reduction in propranolol dose   · DC propranolol, patient will f/u with OP neurology to consider restarting   · Echo pending as syncope suspected possibly cardiac  · EKG- sinus bradycardia w sinus arrhythmia  · Monitor telemetry

## 2023-01-14 NOTE — DISCHARGE SUMMARY
[place]  Discharge- Deepak [address]  1950, 67 y o  male MRN: [id number]  Unit/Bed#: -Melissa Encounter: [id number]  Primary Care Provider: Debbie Mcgraw DO   Date and time admitted to hospital: 1/12/2023  6:33 AM    * Hypoxia  Assessment & Plan  · SpO2 88% on RA - improved to 98% on 2 L NC --> currently weaned to RA (90-95% on RA today)  · Not on O2 at baseline  · CXR unremarkable   · Procalcitonin normal  · Suspect related COVID infection, COPD exac, see below  · Home O2 eval 1/16 -> requiring 2L with exertion   · Home O2 ordered per CM    Chronic obstructive pulmonary disease with acute exacerbation (HCC)  Assessment & Plan  · History of tobacco use  · Not chronically on O2  · Exacerbation in setting of COVID infection  · Completed IV solumedrol daily , transition to PO today, taper on DC  · scheduled neb treatments, respiratory protocol   · Zithromax x3 days    Syncope  Assessment & Plan  · Presented to the ED with witnessed syncopal episode at home, reportedly no head strike  · orthostatic BP negative x 2  · Negative orthostatics on 1/12 after IVF, repeat today   · Echo 10/2022: EF 10% grade 1 diastolic dysfunction  No significant valvular disease  · Suspect related to COVID infection + underlying orthostatic hypotension vs bradycardia  · Patient has been on 30 mg propranolol TID for parkinson's/tremor  Wife notes he has had issues with low HR and syncope in the past requiring dose reduction  · Asymptomatic bradycardia 1/14 -1/15 with HR 30-40s in early am despite reduction in propranolol dose   · DC propranolol, patient will f/u with OP neurology to consider restarting   · Repeat Echo: The left ventricular ejection fraction is 60-65%  Systolic function is normal  Wall motion is normal  Diastolic function is mildly abnormal, consistent with grade I (abnormal) relaxation  No significant valvular abnormality    · EKG- sinus bradycardia w sinus arrhythmia, no evidence heart block   · Monitor telemetry     1/16: Patient bradycardic again overnight despite holding propranolol on 1/14-1/15  Discussed with cardiology, suspect this is physiologic as patient HR increased > 70s when up/awake and no evidence of heart block on EKGs/tele with sinus bradycardia during events  Cardiology recommending OP follow up and no BB --> patient has secheduled appointment with [facility] cardiology this month  COVID-19  Assessment & Plan  · Presented to the ED following syncopal episode    Last few days has been having cough/congestion, fever  · Possible evolving covid PNA, see below   · Tmax 102 7 - does not meet any other SIRS criteria  · COVID + 1/12/23  · Treat via mild pathway, requiring 1-2L   · Remdesivir initiated 1/12  · Solumedrol given instead of decadron  · No indication for Baricitinib   · D dimer elevated + O2 + age > 60--> AC with ACS (low) heparin gtt per protocol   · BC x 2 negative at 4 days     Anxiety and depression  Assessment & Plan  · Continue lexapro  · Continue klonopin qhs  · Confirmed via PDMP - last refill Oct 2022 for 90 day supply    Elevated blood pressure reading  Assessment & Plan  · On arrival to the ED  · Improved without further intervention  · Continue to monitor per unit protocol  · Propanolol on home medications prescribed for tremor --> Discontinued  · Given parkinson's need to monitor for orthostatic BP    Memory loss  Assessment & Plan  · monitor  · Continue memantine    Parkinson's disease (HCC)  Assessment & Plan  · Continue sinemet, primidone, amantadine   · Outpatient follow up with [facility]rology    History of lung cancer  Assessment & Plan  · Follows with [facility] oncology  · Recently completed radiation treatment in Nov 2022  · Also with history of prostate cancer s/p radiation    Pancreatic cancer [facility])  Assessment & Plan  · Recently diagnosed, follows with [facility]  · Scheduled for whipple procedure Feb 2023        Medical Problems     Resolved Problems  Date Reviewed: 1/16/2023   None       Discharging Physician / Practitioner: Valarie Gold PA-C  PCP: Lupe Anderson DO  Admission Date:   Admission Orders (From admission, onward)     Ordered        01/12/23 0848  INPATIENT ADMISSION  Once                      Discharge Date: 01/16/23    Consultations During Hospital Stay:  · None     Procedures Performed:   XR chest portable   Final Result by Christie Moon MD (01/12 1041)      No focal consolidation, pleural effusion, or pneumothorax  Workstation performed: [id number]           ·     Significant Findings / Test Results:   · COVID + 1/12/23    Incidental Findings:   · None     Test Results Pending at Discharge (will require follow up): · None      Outpatient Tests Requested:  · None     Complications:  None     Reason for Admission: Syncope     Hospital Course:   Rich Samano is a 67 y o  male patient with PMH of lung cancer S/P radiation, prostate cancer s/p radiation, pancreatic cancer (recently diagnosed, upcoming whipple), Parkinson's dementia, COPD, prior tobacco use who originally presented to the hospital on 1/12/2023 due to episode of syncope witnessed by wife  The patient was without head strike or injury secondary to fall  Notably patient had also endorsed several days of cough, congestion, fevers prior to this event  The patient tested positive for COVID 19 in ED and was placed on Mild Pathway due to requirement of 1-2 L per hypoxia of SpO2 88% noted in ED  The patient was initiated on IV remdesivir and heparin gtt per protcol  The patient was placed on IV solumedrol rather than decadron 2/2 suspected COPD exacerbation  Throughout admission patient's symptoms improved and he was weaned to RA  Home O2 eval prior to discharge on 1/16 qualified patient for home O2, 2L with exertion  He will be discharged on prednisone taper       The etiology of the patient's fall was suspected to be related to Covid related weakness versus bradycardia due to patient's beta blocker (propranolol 30 mg TID) which was prescribed for parkinson's tremor  The patient was with several episodes of bradycardia while inpatient which improved with holding propranolol  Propranolol was discontinued however patient did have overnight bradycardia with HR in 40s  This was discussed with cardiology, who suspected physiologic bradycardia secondary to sleeping as patient was without evidence of heart block on EKGs/telemetry and HR improved to 70-80s when patient was awake  The patient's HR was stable prior to DC  Repeat echo showed EF 60-65%  Cardiology recommended OP follow up, patient has cardiology appointment with [facility] this month for cardiac clearance prior to Whipple procedure in February  PT/OT recommended HHC, which was set up by [facility] prior to DC  Please see above list of diagnoses and related plan for additional information  Condition at Discharge: stable    Discharge Day Visit / Exam:   Subjective:  Patient without complaints, would like to go home  No SOB, satting well on RA  Vitals: Blood Pressure: 152/67 (01/16/23 1520)  Pulse: 84 (01/16/23 1520)  Temperature: (!) 97 2 °F (36 2 °C) (01/16/23 1406)  Temp Source: Oral (01/14/23 5389)  Respirations: 20 (01/16/23 0100)  Height: 5' 9" (175 3 cm) (01/16/23 1520)  Weight - Scale: 107 kg (235 lb) (01/16/23 1520)  SpO2: 94 % (01/16/23 1416)  Exam:   Physical Exam  Vitals and nursing note reviewed  Constitutional:       General: He is not in acute distress  Appearance: He is well-developed  HENT:      Head: Normocephalic and atraumatic  Eyes:      General:         Right eye: No discharge  Left eye: No discharge  Extraocular Movements: Extraocular movements intact  Conjunctiva/sclera: Conjunctivae normal    Cardiovascular:      Rate and Rhythm: Normal rate and regular rhythm  Heart sounds: No murmur heard    Pulmonary:      Effort: Pulmonary effort is normal  No respiratory distress  Breath sounds: Normal breath sounds  No wheezing, rhonchi or rales  Comments: 93% on RA, wheezing minimal   Abdominal:      General: Bowel sounds are normal  There is no distension  Palpations: Abdomen is soft  Tenderness: There is no abdominal tenderness  Musculoskeletal:      Cervical back: Neck supple  Right lower leg: No edema  Left lower leg: No edema  Skin:     General: Skin is warm and dry  Capillary Refill: Capillary refill takes less than 2 seconds  Neurological:      Mental Status: He is alert and oriented to person, place, and time  Psychiatric:         Mood and Affect: Mood normal          Behavior: Behavior normal           Discussion with Family: Updated  (wife) at bedside  Discharge instructions/Information to patient and family:   See after visit summary for information provided to patient and family  Provisions for Follow-Up Care:  See after visit summary for information related to follow-up care and any pertinent home health orders  Disposition:   Home    Planned Readmission: none      Discharge Statement:  I spent 45 minutes discharging the patient  This time was spent on the day of discharge  I had direct contact with the patient on the day of discharge  Greater than 50% of the total time was spent examining patient, answering all patient questions, arranging and discussing plan of care with patient as well as directly providing post-discharge instructions  Additional time then spent on discharge activities  Discharge Medications:  See after visit summary for reconciled discharge medications provided to patient and/or family        **Please Note: This note may have been constructed using a voice recognition system**

## 2023-01-14 NOTE — PLAN OF CARE
Problem: PAIN - ADULT  Goal: Verbalizes/displays adequate comfort level or baseline comfort level  Description: Interventions:  - Encourage patient to monitor pain and request assistance  - Assess pain using appropriate pain scale  - Administer analgesics based on type and severity of pain and evaluate response  - Implement non-pharmacological measures as appropriate and evaluate response  - Consider cultural and social influences on pain and pain management  - Notify physician/advanced practitioner if interventions unsuccessful or patient reports new pain  Outcome: Progressing     Problem: INFECTION - ADULT  Goal: Absence or prevention of progression during hospitalization  Description: INTERVENTIONS:  - Assess and monitor for signs and symptoms of infection  - Monitor lab/diagnostic results  - Monitor all insertion sites, i e  indwelling lines, tubes, and drains  - Monitor endotracheal if appropriate and nasal secretions for changes in amount and color  - [place] appropriate cooling/warming therapies per order  - Administer medications as ordered  - Instruct and encourage patient and family to use good hand hygiene technique  - Identify and instruct in appropriate isolation precautions for identified infection/condition  Outcome: Progressing     Problem: SAFETY ADULT  Goal: Patient will remain free of falls  Description: INTERVENTIONS:  - Educate patient/family on patient safety including physical limitations  - Instruct patient to call for assistance with activity   - Consult OT/PT to assist with strengthening/mobility   - Keep Call bell within reach  - Keep bed low and locked with side rails adjusted as appropriate  - Keep care items and personal belongings within reach  - Initiate and maintain comfort rounds  - Make Fall Risk Sign visible to staff  - Offer Toileting every 2 Hours, in advance of need  - Initiate/Maintain bed alarm  - Obtain necessary fall risk management equipment: socks, alarm  - Apply yellow socks and bracelet for high fall risk patients  - Consider moving patient to room near nurses station  Outcome: Progressing  Goal: Maintain or return to baseline ADL function  Description: INTERVENTIONS:  -  Assess patient's ability to carry out ADLs; assess patient's baseline for ADL function and identify physical deficits which impact ability to perform ADLs (bathing, care of mouth/teeth, toileting, grooming, dressing, etc )  - Assess/evaluate cause of self-care deficits   - Assess range of motion  - Assess patient's mobility; develop plan if impaired  - Assess patient's need for assistive devices and provide as appropriate  - Encourage maximum independence but intervene and supervise when necessary  - Involve family in performance of ADLs  - Assess for home care needs following discharge   - Consider OT consult to assist with ADL evaluation and planning for discharge  - Provide patient education as appropriate  Outcome: Progressing  Goal: Maintains/Returns to pre admission functional level  Description: INTERVENTIONS:  - Perform BMAT or MOVE assessment daily    - Set and communicate daily mobility goal to care team and patient/family/caregiver  - Collaborate with rehabilitation services on mobility goals if consulted  - Perform Range of Motion 3 times a day  - Reposition patient every 2 hours    - Dangle patient 3 times a day  - Stand patient 3 times a day  - Ambulate patient 3 times a day  - Out of bed to chair 3 times a day   - Out of bed for meals 3 times a day  - Out of bed for toileting  - Record patient progress and toleration of activity level   Outcome: Progressing     Problem: DISCHARGE PLANNING  Goal: Discharge to home or other facility with appropriate resources  Description: INTERVENTIONS:  - Identify barriers to discharge w/patient and caregiver  - Arrange for needed discharge resources and transportation as appropriate  - Identify discharge learning needs (meds, wound care, etc )  - Arrange for interpretive services to assist at discharge as needed  - Refer to Case Management Department for coordinating discharge planning if the patient needs post-hospital services based on physician/advanced practitioner order or complex needs related to functional status, cognitive ability, or social support system  Outcome: Progressing     Problem: Knowledge Deficit  Goal: Patient/family/caregiver demonstrates understanding of disease process, treatment plan, medications, and discharge instructions  Description: Complete learning assessment and assess knowledge base  Interventions:  - Provide teaching at level of understanding  - Provide teaching via preferred learning methods  Outcome: Progressing     Problem: MOBILITY - ADULT  Goal: Maintain or return to baseline ADL function  Description: INTERVENTIONS:  -  Assess patient's ability to carry out ADLs; assess patient's baseline for ADL function and identify physical deficits which impact ability to perform ADLs (bathing, care of mouth/teeth, toileting, grooming, dressing, etc )  - Assess/evaluate cause of self-care deficits   - Assess range of motion  - Assess patient's mobility; develop plan if impaired  - Assess patient's need for assistive devices and provide as appropriate  - Encourage maximum independence but intervene and supervise when necessary  - Involve family in performance of ADLs  - Assess for home care needs following discharge   - Consider OT consult to assist with ADL evaluation and planning for discharge  - Provide patient education as appropriate  Outcome: Progressing  Goal: Maintains/Returns to pre admission functional level  Description: INTERVENTIONS:  - Perform BMAT or MOVE assessment daily    - Set and communicate daily mobility goal to care team and patient/family/caregiver  - Collaborate with rehabilitation services on mobility goals if consulted  - Perform Range of Motion 3 times a day  - Reposition patient every 2 hours    - Dangle patient 3 times a day  - Stand patient 3 times a day  - Ambulate patient 3 times a day  - Out of bed to chair 3 times a day   - Out of bed for meals 3 times a day  - Out of bed for toileting  - Record patient progress and toleration of activity level   Outcome: Progressing     Problem: Potential for Falls  Goal: Patient will remain free of falls  Description: INTERVENTIONS:  - Educate patient/family on patient safety including physical limitations  - Instruct patient to call for assistance with activity   - Consult OT/PT to assist with strengthening/mobility   - Keep Call bell within reach  - Keep bed low and locked with side rails adjusted as appropriate  - Keep care items and personal belongings within reach  - Initiate and maintain comfort rounds  - Make Fall Risk Sign visible to staff  - Offer Toileting every 2 Hours, in advance of need  - Initiate/Maintain bed alarm  - Obtain necessary fall risk management equipment: socks, alarm  - Apply yellow socks and bracelet for high fall risk patients  - Consider moving patient to room near nurses station  Outcome: Progressing

## 2023-01-14 NOTE — PROGRESS NOTES
-- Patient:  -- MRN: [id number]  -- Aidin Request ID: [id number]  -- Level of care reserved: [facility]  -- Partner Reserved: [facility] Tim, [address] [place] [address] [phone]  -- Clinical needs requested:  -- Geography searched: [phone]  -- Start of Service:  -- Request sent: 12:02pm EST on 1/14/2023 by Jane Mendosa  -- Partner reserved: 12:47pm EST on 1/14/2023 by Jane Mendosa  -- Choice list shared: 12:47pm EST on 1/14/2023 by Jane Mendosa

## 2023-01-14 NOTE — CASE MANAGEMENT
Case Management Progress Note    Patient name Paredes Lot  Location [address] 332/-01 MRN [id number]  : 1950 Date 2023       LOS (days): 2  Geometric Mean LOS (GMLOS) (days): 3 60  Days to GMLOS:1 6        OBJECTIVE:        Current admission status: Inpatient  Preferred Pharmacy:   [place] [address] [address]  Phone: [phone] Fax: [phone]    Primary Care Provider: Max Holland DO    Primary Insurance: MEDICARE  Secondary Insurance: [facility]    PROGRESS NOTE:  OT recommending home services  Spoke with patient and spouse via monitor per [employer]  They are agreeable to [facility]  Given list and freedom of choice and they do not have a preference  Referral sent to [facility]/SN/PT/OT  Discussed possible need for home O2 @ discharge and patient is agreeable if needed

## 2023-01-14 NOTE — ASSESSMENT & PLAN NOTE
· History of tobacco use  · Not chronically on O2  · Exacerbation in setting of COVID infection  · Continue IV solumedrol daily today, transition to PO tomorrow   · scheduled neb treatments, respiratory protocol   · Zithromax x3 days

## 2023-01-14 NOTE — ASSESSMENT & PLAN NOTE
· History of tobacco use  · Not chronically on O2  · Exacerbation in setting of COVID infection  · Continue IV solumedrol BID today, hopeful transition to PO tomorrow   · scheduled neb treatments, respiratory protocol   · Zithromax x3 days

## 2023-01-14 NOTE — PLAN OF CARE
Problem: OCCUPATIONAL THERAPY ADULT  Goal: Performs self-care activities at highest level of function for planned discharge setting  See evaluation for individualized goals  Description: Treatment Interventions: ADL retraining, Functional transfer training, UE strengthening/ROM, Endurance training, Patient/family training, Equipment evaluation/education, Compensatory technique education, Continued evaluation  Equipment Recommended: Shower/Tub chair with back ($)       See flowsheet documentation for full assessment, interventions and recommendations  Note: Limitation: Decreased ADL status, Decreased self-care trans, Decreased high-level ADLs, Decreased Safe judgement during ADL  Prognosis: Good  Assessment: Pt is a 67 y o  male seen for OT evaluation at [facility], admitted 1/12/2023 w/ Hypoxia  OT completed extensive review of pt's medical and social history  Comorbidities affecting pt's functional performance at time of assessment include: covid-19, syncope, COPD with acute exacerbation, hx of pancreatic, prostate, & lung CA, Parkinson's Disease, Memory loss, anxiety & depression  Personal factors affecting pt at time of IE include: steps to enter environment, difficulty performing ADLS, difficulty performing IADLS , compliance, health management  and environment  Prior to admission, pt was living with his wife in a bi-level home with 0 ADINA & 5 steps to living area  Pt was I w/  ADLS and A w/ IADLS, (-) drove, & required use of no DME/AD PTA  Upon evaluation: Pt requires S for functional mobility/transfers, S for UB ADLs and Min A for LB ADLS 2* the following deficits impacting occupational performance: weakness, decreased balance, impaired GMC, impulsivity and decreased safety awareness  Full objective findings from OT assessment regarding body systems outlined above   Pt to benefit from continued skilled OT tx while in the hospital to address deficits as defined above and maximize level of functional independence w/ ADL's and functional mobility  Occupational Performance areas to address include: bathing/shower, toilet hygiene, dressing, health maintenance, functional mobility, community mobility and clothing management  Based on findings, pt is of high complexity  The patient's raw score on the AM-PAC Daily Activity inpatient short form is 22, standardized score is 47 1, greater than 39 4  Patients at this level are likely to benefit from DC to home, which does coincide with current above OT recommendations  However, please refer to therapist recommendation for discharge planning given other factors that may influence destination  At this time, OT recommendations at time of discharge are home OT       OT Discharge Recommendation: Home with home health rehabilitation

## 2023-01-15 LAB
ANION GAP SERPL CALCULATED.3IONS-SCNC: 7 MMOL/L (ref 4–13)
APTT PPP: 65 SECONDS (ref 23–37)
ATRIAL RATE: 37 BPM
ATRIAL RATE: 39 BPM
BUN SERPL-MCNC: 22 MG/DL (ref 5–25)
CALCIUM SERPL-MCNC: 8.3 MG/DL (ref 8.3–10.1)
CHLORIDE SERPL-SCNC: 108 MMOL/L (ref 96–108)
CO2 SERPL-SCNC: 30 MMOL/L (ref 21–32)
CREAT SERPL-MCNC: 0.88 MG/DL (ref 0.6–1.3)
ERYTHROCYTE [DISTWIDTH] IN BLOOD BY AUTOMATED COUNT: 13.4 % (ref 11.6–15.1)
GFR SERPL CREATININE-BSD FRML MDRD: 85 ML/MIN/1.73SQ M
GLUCOSE SERPL-MCNC: 182 MG/DL (ref 65–140)
HCT VFR BLD AUTO: 41.9 % (ref 36.5–49.3)
HGB BLD-MCNC: 13.4 G/DL (ref 12–17)
MCH RBC QN AUTO: 31.9 PG (ref 26.8–34.3)
MCHC RBC AUTO-ENTMCNC: 32 G/DL (ref 31.4–37.4)
MCV RBC AUTO: 100 FL (ref 82–98)
PLATELET # BLD AUTO: 161 THOUSANDS/UL (ref 149–390)
PMV BLD AUTO: 9.9 FL (ref 8.9–12.7)
POTASSIUM SERPL-SCNC: 4.1 MMOL/L (ref 3.5–5.3)
QRS AXIS: 41 DEGREES
QRS AXIS: 51 DEGREES
QRSD INTERVAL: 92 MS
QRSD INTERVAL: 94 MS
QT INTERVAL: 504 MS
QT INTERVAL: 566 MS
QTC INTERVAL: 395 MS
QTC INTERVAL: 526 MS
RBC # BLD AUTO: 4.2 MILLION/UL (ref 3.88–5.62)
SODIUM SERPL-SCNC: 145 MMOL/L (ref 135–147)
T WAVE AXIS: 36 DEGREES
T WAVE AXIS: 83 DEGREES
VENTRICULAR RATE: 37 BPM
VENTRICULAR RATE: 52 BPM
WBC # BLD AUTO: 9.96 THOUSAND/UL (ref 4.31–10.16)

## 2023-01-15 RX ORDER — PREDNISONE 20 MG/1
40 TABLET ORAL DAILY
Status: DISCONTINUED | OUTPATIENT
Start: 2023-01-16 | End: 2023-01-16 | Stop reason: HOSPADM

## 2023-01-15 RX ADMIN — FLUTICASONE FUROATE AND VILANTEROL TRIFENATATE 1 PUFF: 200; 25 POWDER RESPIRATORY (INHALATION) at 09:27

## 2023-01-15 RX ADMIN — IPRATROPIUM BROMIDE 2 PUFF: 17 AEROSOL, METERED RESPIRATORY (INHALATION) at 09:27

## 2023-01-15 RX ADMIN — MEMANTINE 10 MG: 5 TABLET ORAL at 17:55

## 2023-01-15 RX ADMIN — ESCITALOPRAM OXALATE 20 MG: 20 TABLET ORAL at 09:26

## 2023-01-15 RX ADMIN — IPRATROPIUM BROMIDE 2 PUFF: 17 AEROSOL, METERED RESPIRATORY (INHALATION) at 18:16

## 2023-01-15 RX ADMIN — CLONAZEPAM 2 MG: 1 TABLET ORAL at 22:22

## 2023-01-15 RX ADMIN — PRIMIDONE 50 MG: 50 TABLET ORAL at 22:22

## 2023-01-15 RX ADMIN — CARBIDOPA AND LEVODOPA 2 TABLET: 25; 100 TABLET ORAL at 17:55

## 2023-01-15 RX ADMIN — IPRATROPIUM BROMIDE 2 PUFF: 17 AEROSOL, METERED RESPIRATORY (INHALATION) at 22:25

## 2023-01-15 RX ADMIN — GUAIFENESIN 600 MG: 600 TABLET ORAL at 09:26

## 2023-01-15 RX ADMIN — ACETAMINOPHEN 650 MG: 325 TABLET ORAL at 09:25

## 2023-01-15 RX ADMIN — CARBIDOPA AND LEVODOPA 3 TABLET: 25; 100 TABLET ORAL at 14:12

## 2023-01-15 RX ADMIN — CARBIDOPA AND LEVODOPA 3 TABLET: 25; 100 TABLET ORAL at 12:02

## 2023-01-15 RX ADMIN — CARBIDOPA AND LEVODOPA 2 TABLET: 25; 100 TABLET ORAL at 22:22

## 2023-01-15 RX ADMIN — OXYBUTYNIN CHLORIDE 10 MG: 5 TABLET, EXTENDED RELEASE ORAL at 17:55

## 2023-01-15 RX ADMIN — AMANTADINE HYDROCHLORIDE 100 MG: 100 CAPSULE ORAL at 09:26

## 2023-01-15 RX ADMIN — AMANTADINE HYDROCHLORIDE 100 MG: 100 CAPSULE ORAL at 14:12

## 2023-01-15 RX ADMIN — LEVALBUTEROL HYDROCHLORIDE 0.63 MG: 0.63 SOLUTION RESPIRATORY (INHALATION) at 13:21

## 2023-01-15 RX ADMIN — Medication 9 MG: at 22:22

## 2023-01-15 RX ADMIN — LEVALBUTEROL HYDROCHLORIDE 0.63 MG: 0.63 SOLUTION RESPIRATORY (INHALATION) at 20:29

## 2023-01-15 RX ADMIN — GUAIFENESIN 600 MG: 600 TABLET ORAL at 22:22

## 2023-01-15 RX ADMIN — CARBIDOPA AND LEVODOPA 3 TABLET: 25; 100 TABLET ORAL at 09:25

## 2023-01-15 RX ADMIN — HEPARIN SODIUM 13.1 UNITS/KG/HR: 10000 INJECTION, SOLUTION INTRAVENOUS at 05:26

## 2023-01-15 RX ADMIN — ZINC SULFATE 220 MG (50 MG) CAPSULE 220 MG: CAPSULE at 12:05

## 2023-01-15 RX ADMIN — REMDESIVIR 100 MG: 100 INJECTION, POWDER, LYOPHILIZED, FOR SOLUTION INTRAVENOUS at 17:55

## 2023-01-15 RX ADMIN — LEVALBUTEROL HYDROCHLORIDE 0.63 MG: 0.63 SOLUTION RESPIRATORY (INHALATION) at 07:14

## 2023-01-15 RX ADMIN — IPRATROPIUM BROMIDE 2 PUFF: 17 AEROSOL, METERED RESPIRATORY (INHALATION) at 12:02

## 2023-01-15 RX ADMIN — MEMANTINE 10 MG: 5 TABLET ORAL at 09:26

## 2023-01-15 RX ADMIN — METHYLPREDNISOLONE SODIUM SUCCINATE 40 MG: 40 INJECTION, POWDER, FOR SOLUTION INTRAMUSCULAR; INTRAVENOUS at 09:26

## 2023-01-15 NOTE — CASE MANAGEMENT
Case Management Discharge Planning Note    Patient name [facility]  Location [address] 332/-01 MRN [id number]  : 1950 Date 1/15/2023       Current Admission Date: 2023  Current Admission Diagnosis:Hypoxia   Patient Active Problem List    Diagnosis Date Noted   • Hypoxia 2023   • COVID-19 2023   • Syncope 2023   • Chronic obstructive pulmonary disease with acute exacerbation ([facility] [address] ) 2023   • Pancreatic cancer ([facility] [address] ) 2023   • History of prostate cancer 2023   • History of lung cancer 2023   • Parkinson's disease ([facility] [address] ) 2023   • Memory loss 2023   • Elevated blood pressure reading 2023   • Anxiety and depression 2023      LOS (days): 3  Geometric Mean LOS (GMLOS) (days): 3 60  Days to GMLOS:0 5     OBJECTIVE:  Risk of Unplanned Readmission Score: 15 55         Current admission status: Inpatient   Preferred Pharmacy:   [address] [address]  Phone: [phone] Fax: [phone]    Primary Care Provider: Marty Oconnor DO    Primary Insurance: MEDICARE  Secondary Insurance: [facility]    DISCHARGE DETAILS:  Patient for tentative discharge today  He will need home O2  He is agreeable  He does not have a preference for supplier  Referral to [place]

## 2023-01-15 NOTE — PLAN OF CARE
Problem: PAIN - ADULT  Goal: Verbalizes/displays adequate comfort level or baseline comfort level  Description: Interventions:  - Encourage patient to monitor pain and request assistance  - Assess pain using appropriate pain scale  - Administer analgesics based on type and severity of pain and evaluate response  - Implement non-pharmacological measures as appropriate and evaluate response  - Consider cultural and social influences on pain and pain management  - Notify physician/advanced practitioner if interventions unsuccessful or patient reports new pain  Outcome: Progressing     Problem: INFECTION - ADULT  Goal: Absence or prevention of progression during hospitalization  Description: INTERVENTIONS:  - Assess and monitor for signs and symptoms of infection  - Monitor lab/diagnostic results  - Monitor all insertion sites, i e  indwelling lines, tubes, and drains  - Monitor endotracheal if appropriate and nasal secretions for changes in amount and color  - [place] appropriate cooling/warming therapies per order  - Administer medications as ordered  - Instruct and encourage patient and family to use good hand hygiene technique  - Identify and instruct in appropriate isolation precautions for identified infection/condition  Outcome: Progressing     Problem: SAFETY ADULT  Goal: Patient will remain free of falls  Description: INTERVENTIONS:  - Educate patient/family on patient safety including physical limitations  - Instruct patient to call for assistance with activity   - Consult OT/PT to assist with strengthening/mobility   - Keep Call bell within reach  - Keep bed low and locked with side rails adjusted as appropriate  - Keep care items and personal belongings within reach  - Initiate and maintain comfort rounds  - Make Fall Risk Sign visible to staff  - Offer Toileting every 2 Hours, in advance of need  - Initiate/Maintain bed alarm  - Obtain necessary fall risk management equipment: socks, alarm  - Apply yellow socks and bracelet for high fall risk patients  - Consider moving patient to room near nurses station  Outcome: Progressing  Goal: Maintain or return to baseline ADL function  Description: INTERVENTIONS:  -  Assess patient's ability to carry out ADLs; assess patient's baseline for ADL function and identify physical deficits which impact ability to perform ADLs (bathing, care of mouth/teeth, toileting, grooming, dressing, etc )  - Assess/evaluate cause of self-care deficits   - Assess range of motion  - Assess patient's mobility; develop plan if impaired  - Assess patient's need for assistive devices and provide as appropriate  - Encourage maximum independence but intervene and supervise when necessary  - Involve family in performance of ADLs  - Assess for home care needs following discharge   - Consider OT consult to assist with ADL evaluation and planning for discharge  - Provide patient education as appropriate  Outcome: Progressing  Goal: Maintains/Returns to pre admission functional level  Description: INTERVENTIONS:  - Perform BMAT or MOVE assessment daily    - Set and communicate daily mobility goal to care team and patient/family/caregiver  - Collaborate with rehabilitation services on mobility goals if consulted  - Perform Range of Motion 3 times a day  - Reposition patient every 2 hours    - Dangle patient 3 times a day  - Stand patient 3 times a day  - Ambulate patient 3 times a day  - Out of bed to chair 3 times a day   - Out of bed for meals 3 times a day  - Out of bed for toileting  - Record patient progress and toleration of activity level   Outcome: Progressing     Problem: DISCHARGE PLANNING  Goal: Discharge to home or other facility with appropriate resources  Description: INTERVENTIONS:  - Identify barriers to discharge w/patient and caregiver  - Arrange for needed discharge resources and transportation as appropriate  - Identify discharge learning needs (meds, wound care, etc )  - Arrange for interpretive services to assist at discharge as needed  - Refer to Case Management Department for coordinating discharge planning if the patient needs post-hospital services based on physician/advanced practitioner order or complex needs related to functional status, cognitive ability, or social support system  Outcome: Progressing     Problem: Knowledge Deficit  Goal: Patient/family/caregiver demonstrates understanding of disease process, treatment plan, medications, and discharge instructions  Description: Complete learning assessment and assess knowledge base  Interventions:  - Provide teaching at level of understanding  - Provide teaching via preferred learning methods  Outcome: Progressing     Problem: MOBILITY - ADULT  Goal: Maintain or return to baseline ADL function  Description: INTERVENTIONS:  -  Assess patient's ability to carry out ADLs; assess patient's baseline for ADL function and identify physical deficits which impact ability to perform ADLs (bathing, care of mouth/teeth, toileting, grooming, dressing, etc )  - Assess/evaluate cause of self-care deficits   - Assess range of motion  - Assess patient's mobility; develop plan if impaired  - Assess patient's need for assistive devices and provide as appropriate  - Encourage maximum independence but intervene and supervise when necessary  - Involve family in performance of ADLs  - Assess for home care needs following discharge   - Consider OT consult to assist with ADL evaluation and planning for discharge  - Provide patient education as appropriate  Outcome: Progressing  Goal: Maintains/Returns to pre admission functional level  Description: INTERVENTIONS:  - Perform BMAT or MOVE assessment daily    - Set and communicate daily mobility goal to care team and patient/family/caregiver  - Collaborate with rehabilitation services on mobility goals if consulted  - Perform Range of Motion 3 times a day  - Reposition patient every 2 hours    - Dangle patient 3 times a day  - Stand patient 3 times a day  - Ambulate patient 3 times a day  - Out of bed to chair 3 times a day   - Out of bed for meals 3 times a day  - Out of bed for toileting  - Record patient progress and toleration of activity level   Outcome: Progressing     Problem: Potential for Falls  Goal: Patient will remain free of falls  Description: INTERVENTIONS:  - Educate patient/family on patient safety including physical limitations  - Instruct patient to call for assistance with activity   - Consult OT/PT to assist with strengthening/mobility   - Keep Call bell within reach  - Keep bed low and locked with side rails adjusted as appropriate  - Keep care items and personal belongings within reach  - Initiate and maintain comfort rounds  - Make Fall Risk Sign visible to staff  - Offer Toileting every 2 Hours, in advance of need  - Initiate/Maintain bed alarm  - Obtain necessary fall risk management equipment: socks, alarm  - Apply yellow socks and bracelet for high fall risk patients  - Consider moving patient to room near nurses station  Outcome: Progressing

## 2023-01-15 NOTE — PLAN OF CARE
Problem: PAIN - ADULT  Goal: Verbalizes/displays adequate comfort level or baseline comfort level  Description: Interventions:  - Encourage patient to monitor pain and request assistance  - Assess pain using appropriate pain scale  - Administer analgesics based on type and severity of pain and evaluate response  - Implement non-pharmacological measures as appropriate and evaluate response  - Consider cultural and social influences on pain and pain management  - Notify physician/advanced practitioner if interventions unsuccessful or patient reports new pain  Outcome: Progressing     Problem: INFECTION - ADULT  Goal: Absence or prevention of progression during hospitalization  Description: INTERVENTIONS:  - Assess and monitor for signs and symptoms of infection  - Monitor lab/diagnostic results  - Monitor all insertion sites, i e  indwelling lines, tubes, and drains  - Monitor endotracheal if appropriate and nasal secretions for changes in amount and color  - [place] appropriate cooling/warming therapies per order  - Administer medications as ordered  - Instruct and encourage patient and family to use good hand hygiene technique  - Identify and instruct in appropriate isolation precautions for identified infection/condition  Outcome: Progressing     Problem: SAFETY ADULT  Goal: Patient will remain free of falls  Description: INTERVENTIONS:  - Educate patient/family on patient safety including physical limitations  - Instruct patient to call for assistance with activity   - Consult OT/PT to assist with strengthening/mobility   - Keep Call bell within reach  - Keep bed low and locked with side rails adjusted as appropriate  - Keep care items and personal belongings within reach  - Initiate and maintain comfort rounds  - Make Fall Risk Sign visible to staff  - Offer Toileting every 2 Hours, in advance of need  - Initiate/Maintain bed alarm  - Obtain necessary fall risk management equipment: socks, alarm  - Apply yellow socks and bracelet for high fall risk patients  - Consider moving patient to room near nurses station  Outcome: Progressing  Goal: Maintain or return to baseline ADL function  Description: INTERVENTIONS:  -  Assess patient's ability to carry out ADLs; assess patient's baseline for ADL function and identify physical deficits which impact ability to perform ADLs (bathing, care of mouth/teeth, toileting, grooming, dressing, etc )  - Assess/evaluate cause of self-care deficits   - Assess range of motion  - Assess patient's mobility; develop plan if impaired  - Assess patient's need for assistive devices and provide as appropriate  - Encourage maximum independence but intervene and supervise when necessary  - Involve family in performance of ADLs  - Assess for home care needs following discharge   - Consider OT consult to assist with ADL evaluation and planning for discharge  - Provide patient education as appropriate  Outcome: Progressing  Goal: Maintains/Returns to pre admission functional level  Description: INTERVENTIONS:  - Perform BMAT or MOVE assessment daily    - Set and communicate daily mobility goal to care team and patient/family/caregiver  - Collaborate with rehabilitation services on mobility goals if consulted  - Perform Range of Motion 3 times a day  - Reposition patient every 2 hours    - Dangle patient 3 times a day  - Stand patient 3 times a day  - Ambulate patient 3 times a day  - Out of bed to chair 3 times a day   - Out of bed for meals 3 times a day  - Out of bed for toileting  - Record patient progress and toleration of activity level   Outcome: Progressing     Problem: DISCHARGE PLANNING  Goal: Discharge to home or other facility with appropriate resources  Description: INTERVENTIONS:  - Identify barriers to discharge w/patient and caregiver  - Arrange for needed discharge resources and transportation as appropriate  - Identify discharge learning needs (meds, wound care, etc )  - Arrange for interpretive services to assist at discharge as needed  - Refer to Case Management Department for coordinating discharge planning if the patient needs post-hospital services based on physician/advanced practitioner order or complex needs related to functional status, cognitive ability, or social support system  Outcome: Progressing     Problem: Knowledge Deficit  Goal: Patient/family/caregiver demonstrates understanding of disease process, treatment plan, medications, and discharge instructions  Description: Complete learning assessment and assess knowledge base  Interventions:  - Provide teaching at level of understanding  - Provide teaching via preferred learning methods  Outcome: Progressing     Problem: MOBILITY - ADULT  Goal: Maintain or return to baseline ADL function  Description: INTERVENTIONS:  -  Assess patient's ability to carry out ADLs; assess patient's baseline for ADL function and identify physical deficits which impact ability to perform ADLs (bathing, care of mouth/teeth, toileting, grooming, dressing, etc )  - Assess/evaluate cause of self-care deficits   - Assess range of motion  - Assess patient's mobility; develop plan if impaired  - Assess patient's need for assistive devices and provide as appropriate  - Encourage maximum independence but intervene and supervise when necessary  - Involve family in performance of ADLs  - Assess for home care needs following discharge   - Consider OT consult to assist with ADL evaluation and planning for discharge  - Provide patient education as appropriate  Outcome: Progressing  Goal: Maintains/Returns to pre admission functional level  Description: INTERVENTIONS:  - Perform BMAT or MOVE assessment daily    - Set and communicate daily mobility goal to care team and patient/family/caregiver  - Collaborate with rehabilitation services on mobility goals if consulted  - Perform Range of Motion 3 times a day  - Reposition patient every 2 hours    - Dangle patient 3 times a day  - Stand patient 3 times a day  - Ambulate patient 3 times a day  - Out of bed to chair 3 times a day   - Out of bed for meals 3 times a day  - Out of bed for toileting  - Record patient progress and toleration of activity level   Outcome: Progressing     Problem: Potential for Falls  Goal: Patient will remain free of falls  Description: INTERVENTIONS:  - Educate patient/family on patient safety including physical limitations  - Instruct patient to call for assistance with activity   - Consult OT/PT to assist with strengthening/mobility   - Keep Call bell within reach  - Keep bed low and locked with side rails adjusted as appropriate  - Keep care items and personal belongings within reach  - Initiate and maintain comfort rounds  - Make Fall Risk Sign visible to staff  - Offer Toileting every 2 Hours, in advance of need  - Initiate/Maintain bed alarm  - Obtain necessary fall risk management equipment: socks, alarm  - Apply yellow socks and bracelet for high fall risk patients  - Consider moving patient to room near nurses station  Outcome: Progressing

## 2023-01-15 NOTE — PLAN OF CARE
Problem: PAIN - ADULT  Goal: Verbalizes/displays adequate comfort level or baseline comfort level  Description: Interventions:  - Encourage patient to monitor pain and request assistance  - Assess pain using appropriate pain scale  - Administer analgesics based on type and severity of pain and evaluate response  - Implement non-pharmacological measures as appropriate and evaluate response  - Consider cultural and social influences on pain and pain management  - Notify physician/advanced practitioner if interventions unsuccessful or patient reports new pain  1/15/2023 1144 by Ivy Almeida RN  Outcome: Progressing  1/15/2023 0950 by Ivy Almeida RN  Outcome: Progressing     Problem: INFECTION - ADULT  Goal: Absence or prevention of progression during hospitalization  Description: INTERVENTIONS:  - Assess and monitor for signs and symptoms of infection  - Monitor lab/diagnostic results  - Monitor all insertion sites, i e  indwelling lines, tubes, and drains  - Monitor endotracheal if appropriate and nasal secretions for changes in amount and color  - [place] appropriate cooling/warming therapies per order  - Administer medications as ordered  - Instruct and encourage patient and family to use good hand hygiene technique  - Identify and instruct in appropriate isolation precautions for identified infection/condition  1/15/2023 1144 by Ivy Almeida RN  Outcome: Progressing  1/15/2023 0950 by Ivy Almeida RN  Outcome: Progressing     Problem: SAFETY ADULT  Goal: Patient will remain free of falls  Description: INTERVENTIONS:  - Educate patient/family on patient safety including physical limitations  - Instruct patient to call for assistance with activity   - Consult OT/PT to assist with strengthening/mobility   - Keep Call bell within reach  - Keep bed low and locked with side rails adjusted as appropriate  - Keep care items and personal belongings within reach  - Initiate and maintain comfort rounds  - Make Fall Risk Sign visible to staff  - Offer Toileting every 2 Hours, in advance of need  - Initiate/Maintain bed alarm  - Obtain necessary fall risk management equipment: socks, alarm  - Apply yellow socks and bracelet for high fall risk patients  - Consider moving patient to room near nurses station  1/15/2023 1144 by Emmie Molina RN  Outcome: Progressing  1/15/2023 0950 by Emmie Molina RN  Outcome: Progressing  Goal: Maintain or return to baseline ADL function  Description: INTERVENTIONS:  -  Assess patient's ability to carry out ADLs; assess patient's baseline for ADL function and identify physical deficits which impact ability to perform ADLs (bathing, care of mouth/teeth, toileting, grooming, dressing, etc )  - Assess/evaluate cause of self-care deficits   - Assess range of motion  - Assess patient's mobility; develop plan if impaired  - Assess patient's need for assistive devices and provide as appropriate  - Encourage maximum independence but intervene and supervise when necessary  - Involve family in performance of ADLs  - Assess for home care needs following discharge   - Consider OT consult to assist with ADL evaluation and planning for discharge  - Provide patient education as appropriate  1/15/2023 1144 by Emmie Molina RN  Outcome: Progressing  1/15/2023 0950 by Emmie Molina RN  Outcome: Progressing  Goal: Maintains/Returns to pre admission functional level  Description: INTERVENTIONS:  - Perform BMAT or MOVE assessment daily    - Set and communicate daily mobility goal to care team and patient/family/caregiver  - Collaborate with rehabilitation services on mobility goals if consulted  - Perform Range of Motion 3 times a day  - Reposition patient every 2 hours    - Dangle patient 3 times a day  - Stand patient 3 times a day  - Ambulate patient 3 times a day  - Out of bed to chair 3 times a day   - Out of bed for meals 3 times a day  - Out of bed for toileting  - Record patient progress and toleration of activity level   1/15/2023 1144 by Darleen Torres RN  Outcome: Progressing  1/15/2023 0950 by Darleen Torres RN  Outcome: Progressing     Problem: DISCHARGE PLANNING  Goal: Discharge to home or other facility with appropriate resources  Description: INTERVENTIONS:  - Identify barriers to discharge w/patient and caregiver  - Arrange for needed discharge resources and transportation as appropriate  - Identify discharge learning needs (meds, wound care, etc )  - Arrange for interpretive services to assist at discharge as needed  - Refer to Case Management Department for coordinating discharge planning if the patient needs post-hospital services based on physician/advanced practitioner order or complex needs related to functional status, cognitive ability, or social support system  1/15/2023 1144 by Darleen Torres RN  Outcome: Progressing  1/15/2023 0950 by Darleen Torres RN  Outcome: Progressing     Problem: Knowledge Deficit  Goal: Patient/family/caregiver demonstrates understanding of disease process, treatment plan, medications, and discharge instructions  Description: Complete learning assessment and assess knowledge base    Interventions:  - Provide teaching at level of understanding  - Provide teaching via preferred learning methods  1/15/2023 1144 by Darleen Torres RN  Outcome: Progressing  1/15/2023 0950 by Darleen Torres RN  Outcome: Progressing     Problem: MOBILITY - ADULT  Goal: Maintain or return to baseline ADL function  Description: INTERVENTIONS:  -  Assess patient's ability to carry out ADLs; assess patient's baseline for ADL function and identify physical deficits which impact ability to perform ADLs (bathing, care of mouth/teeth, toileting, grooming, dressing, etc )  - Assess/evaluate cause of self-care deficits   - Assess range of motion  - Assess patient's mobility; develop plan if impaired  - Assess patient's need for assistive devices and provide as appropriate  - Encourage maximum independence but intervene and supervise when necessary  - Involve family in performance of ADLs  - Assess for home care needs following discharge   - Consider OT consult to assist with ADL evaluation and planning for discharge  - Provide patient education as appropriate  1/15/2023 1144 by Earnestine Snyder RN  Outcome: Progressing  1/15/2023 0950 by Earnestine Snyder RN  Outcome: Progressing  Goal: Maintains/Returns to pre admission functional level  Description: INTERVENTIONS:  - Perform BMAT or MOVE assessment daily    - Set and communicate daily mobility goal to care team and patient/family/caregiver  - Collaborate with rehabilitation services on mobility goals if consulted  - Perform Range of Motion 3 times a day  - Reposition patient every 2 hours    - Dangle patient 3 times a day  - Stand patient 3 times a day  - Ambulate patient 3 times a day  - Out of bed to chair 3 times a day   - Out of bed for meals 3 times a day  - Out of bed for toileting  - Record patient progress and toleration of activity level   1/15/2023 1144 by Earnestine Snyder RN  Outcome: Progressing  1/15/2023 0950 by Earnestine Snyder RN  Outcome: Progressing     Problem: Potential for Falls  Goal: Patient will remain free of falls  Description: INTERVENTIONS:  - Educate patient/family on patient safety including physical limitations  - Instruct patient to call for assistance with activity   - Consult OT/PT to assist with strengthening/mobility   - Keep Call bell within reach  - Keep bed low and locked with side rails adjusted as appropriate  - Keep care items and personal belongings within reach  - Initiate and maintain comfort rounds  - Make Fall Risk Sign visible to staff  - Offer Toileting every 2 Hours, in advance of need  - Initiate/Maintain bed alarm  - Obtain necessary fall risk management equipment: socks, alarm  - Apply yellow socks and bracelet for high fall risk patients  - Consider moving patient to room near nurses station  1/15/2023 1144 by Deya Otero RN  Outcome: Progressing  1/15/2023 0950 by Deya Otero RN  Outcome: Progressing

## 2023-01-15 NOTE — QUICK NOTE
Propanolol discontinued due to overnight continued bradycardia despite dose reduction of propanolol  EKG showing sinus bradycardia  Pt continue to mentate appropriately and BP remains stable

## 2023-01-15 NOTE — RESPIRATORY THERAPY NOTE
Home Oxygen Qualifying Test     Patient name: Reggie Lopez        : 1950   Date of Test:  January 15, 2023  Diagnosis:    Home Oxygen Test:    **Medicare Guidelines require item(s) 1-5 on all ambulatory patients or 1 and 2 on non-ambulatory patients  1  Baseline SPO2 on Room Air at rest 87 %   a  If <= 88% on Room Air add O2 via NC to obtain SpO2 >=88%  If LPM needed, document LPM 2 needed to reach =>88%    2  SPO2 during exertion on Room Air 85  %  a  During exertion monitor SPO2  If SPO2 increases >=89%, do not add supplemental oxygen    3  SPO2 on Oxygen at Rest 92 % at 2 LPM    4  SPO2 during exertion on Oxygen 90 % at 2 LPM    5  Test performed during exertion activity  [x]  Supplemental Home Oxygen is indicated  []  Client does not qualify for home oxygen      Respiratory Additional Notes-     Junie Mcdaniel, RT

## 2023-01-15 NOTE — PROGRESS NOTES
[place]  Progress Note Carlie Rodney Astheimer 1950, 67 y o  male MRN: [id number]  Unit/Bed#: -Melissa Encounter: [id number]  Primary Care Provider: Tahira Hobbs DO   Date and time admitted to hospital: 1/12/2023  6:33 AM    * Hypoxia  Assessment & Plan  · SpO2 88% on RA - improved to 98% on 2 L NC --> currently weaned to RA (89-95% on RA today)  Intermittently needing 1-2L  · Not on O2 at baseline  · CXR unremarkable   · Procalcitonin normal  · Suspect related COVID infection, COPD exac  · Wean O2 as able, see individual problems   · Home O2 eval 1/15 -> requiring 2L at rest and with exertion   · Home O2 ordered per CM, expect to go home in am    Chronic obstructive pulmonary disease with acute exacerbation (HCC)  Assessment & Plan  · History of tobacco use  · Not chronically on O2  · Exacerbation in setting of COVID infection  · Continue IV solumedrol daily today, transition to PO tomorrow   · scheduled neb treatments, respiratory protocol   · Zithromax x3 days    Syncope  Assessment & Plan  · Presented to the ED with witnessed syncopal episode at home, reportedly no head strike  · orthostatic BP negative x 2  · Negative orthostatics on 1/12 after IVF, repeat today   · Echo 10/2022: EF 45% grade 1 diastolic dysfunction  No significant valvular disease  · Suspect related to COVID infection + underlying orthostatic hypotension vs bradycardia  · Patient has been on 30 mg propranolol TID for parkinson's/tremor  Wife notes he has had issues with low HR and syncope in the past requiring dose reduction  · Asymptomatic bradycardia 1/14 -1/15 with HR 30-40s in early am despite reduction in propranolol dose   · DC propranolol, patient will f/u with OP neurology to consider restarting   · Echo pending as syncope suspected possibly cardiac  · EKG- sinus bradycardia w sinus arrhythmia  · Monitor telemetry     COVID-19  Assessment & Plan  · Presented to the ED following syncopal episode  Last few days has been having cough/congestion, fever  · Possible evolving covid PNA, see below   · Tmax 102 7 - does not meet any other SIRS criteria  · COVID + 1/12/23  · Treat via mild pathway, requiring 1-2L   · Remdesivir initiated 1/12  · Solumedrol given instead of decadron  · No indication for Baricitinib   · D dimer elevated + O2 + age > 60--> AC with ACS (low) heparin gtt per protocol   · BC x 2 negative at 72 hr    Anxiety and depression  Assessment & Plan  · Continue lexapro  · Continue klonopin qhs  · Confirmed via PDMP - last refill Oct 2022 for 90 day supply    Elevated blood pressure reading  Assessment & Plan  · On arrival to the ED  · Improved without further intervention  · Continue to monitor per unit protocol  · Propanolol on home medications prescribed for tremor --> DC'd  · Given parkinson's need to monitor for orthostatic BP    Memory loss  Assessment & Plan  · monitor  · Continue memantine    Parkinson's disease (HCC)  Assessment & Plan  · Continue sinemet, primidone, amantadine   · Outpatient follow up with [facility]    History of lung cancer  Assessment & Plan  · Follows with [facility] oncology  · Recently completed radiation treatment in Nov 2022  · Also with history of prostate cancer s/p radiation    Pancreatic cancer [facility])  Assessment & Plan  · Recently diagnosed, follows with [facility]  · Scheduled for whipple procedure Feb 2023    VTE Pharmacologic Prophylaxis: VTE Score: 6 High Risk (Score >/= 5) - Pharmacological DVT Prophylaxis Ordered: heparin drip  Sequential Compression Devices Ordered  Patient Centered Rounds: I performed bedside rounds with nursing staff today  Discussions with Specialists or Other Care Team Provider: none     Education and Discussions with Family / Patient: Updated  (wife) at bedside  Time Spent for Care: 30 minutes  More than 50% of total time spent on counseling and coordination of care as described above      Current Length of Stay: 3 day(s)  Current Patient Status: Inpatient   Certification Statement: The patient will continue to require additional inpatient hospital stay due to echo  Discharge Plan: Anticipate discharge tomorrow to home with home services  Code Status: Level 1 - Full Code    Subjective:   Patient feeling very fatigued today, however denies specific chest pain, SOB, or other localizing symptom    Objective:     Vitals:   Temp (24hrs), Av 8 °F (36 6 °C), Min:97 4 °F (36 3 °C), Max:98 3 °F (36 8 °C)    Temp:  [97 4 °F (36 3 °C)-98 3 °F (36 8 °C)] 97 8 °F (36 6 °C)  HR:  [38-85] 78  Resp:  [16-20] 16  BP: (112-151)/(51-85) 142/70  SpO2:  [87 %-98 %] 93 %  Body mass index is 36 01 kg/m²  Input and Output Summary (last 24 hours): Intake/Output Summary (Last 24 hours) at 1/15/2023 1454  Last data filed at 1/15/2023 1002  Gross per 24 hour   Intake 300 ml   Output --   Net 300 ml       Physical Exam:   Physical Exam  Vitals and nursing note reviewed  Constitutional:       General: He is not in acute distress  Appearance: He is well-developed  HENT:      Head: Normocephalic and atraumatic  Eyes:      General:         Right eye: No discharge  Left eye: No discharge  Conjunctiva/sclera: Conjunctivae normal    Cardiovascular:      Rate and Rhythm: Normal rate and regular rhythm  Heart sounds: No murmur heard  Pulmonary:      Effort: Pulmonary effort is normal  No respiratory distress  Breath sounds: Wheezing present  No rhonchi or rales  Comments: 93% RA  Abdominal:      General: Bowel sounds are normal  There is no distension  Palpations: Abdomen is soft  Tenderness: There is no abdominal tenderness  Musculoskeletal:      Cervical back: Neck supple  Right lower leg: No edema  Left lower leg: No edema  Skin:     General: Skin is warm and dry  Capillary Refill: Capillary refill takes less than 2 seconds  Neurological:      Mental Status: He is alert  Psychiatric:         Mood and Affect: Mood normal          Behavior: Behavior normal           Additional Data:     Labs:  Results from last 7 days   Lab Units 01/15/23  0528 01/14/23  0153 01/13/23  0305   WBC Thousand/uL 9 96 11 04* 6 17   HEMOGLOBIN g/dL 13 4 13 6 13 6   HEMATOCRIT % 41 9 43 5 43 7   PLATELETS Thousands/uL 161 147* 143*   NEUTROS PCT %  --   --  88*   LYMPHS PCT %  --   --  7*   LYMPHO PCT %  --  5*  --    MONOS PCT %  --   --  5   MONO PCT %  --  2*  --    EOS PCT %  --  0 0     Results from last 7 days   Lab Units 01/15/23  0528 01/14/23  0153 01/13/23  0305   SODIUM mmol/L 145   < > 140   POTASSIUM mmol/L 4 1   < > 4 1   CHLORIDE mmol/L 108   < > 108   CO2 mmol/L 30   < > 28   BUN mg/dL 22   < > 20   CREATININE mg/dL 0 88   < > 0 91   ANION GAP mmol/L 7   < > 4   CALCIUM mg/dL 8 3   < > 8 7   ALBUMIN g/dL  --   --  2 9*   TOTAL BILIRUBIN mg/dL  --   --  0 40   ALK PHOS U/L  --   --  59   ALT U/L  --   --  <6*   AST U/L  --   --  11   GLUCOSE RANDOM mg/dL 182*   < > 182*    < > = values in this interval not displayed  Results from last 7 days   Lab Units 01/13/23  1222   INR  0 97             Results from last 7 days   Lab Units 01/12/23  0646   LACTIC ACID mmol/L 0 8   PROCALCITONIN ng/ml 0 10       Lines/Drains:  Invasive Devices     Peripheral Intravenous Line  Duration           Peripheral IV 01/13/23 Right Forearm 2 days    Peripheral IV 01/14/23 Dorsal (posterior); Right Hand 1 day                  Telemetry:  Telemetry Orders (From admission, onward)             48 Hour Telemetry Monitoring  Continuous x 48 hours        References:    Telemetry Guidelines   Question:  Reason for 48 Hour Telemetry  Answer:  Arrhythmias Requiring Medical Therapy (eg  SVT, Vtach/fib, Bradycardia, Uncontrolled A-fib)                 Telemetry Reviewed: Normal Sinus Rhythm HR 50-70s  Indication for Continued Telemetry Use: Arrthymias requiring medical therapy             Imaging: Reviewed radiology reports from this admission including: chest xray    Recent Cultures (last 7 days):   Results from last 7 days   Lab Units 01/12/23  0646   BLOOD CULTURE  No Growth at 72 hrs  No Growth at 72 hrs         Last 24 Hours Medication List:   Current Facility-Administered Medications   Medication Dose Route Frequency Provider Last Rate   • acetaminophen  650 mg Oral Q6H PRN Manjeet Godinez MD     • albuterol  1 puff Inhalation Q4H PRN Ayala Gallo MD     • aluminum-magnesium hydroxide-simethicone  30 mL Oral Q6H PRN Ayala Gallo MD     • amantadine  100 mg Oral BID Ayala Gallo MD     • carbidopa-levodopa  2 tablet Oral BID Ayala Gallo MD     • carbidopa-levodopa  3 tablet Oral TID Ayala Gallo MD     • clonazePAM  2 mg Oral HS Ayala Gallo MD     • escitalopram  20 mg Oral Daily Ayala Gallo MD     • fluticasone-vilanterol  1 puff Inhalation Daily Ayala Gallo MD     • guaiFENesin  600 mg Oral Q12H [address] Patience Fenton PA-C     • heparin (porcine)  3-20 Units/kg/hr (Order-Specific) Intravenous Titrated Toño Bingham PA-C 13 1 Units/kg/hr (01/15/23 0526)   • heparin (porcine)  2,000 Units Intravenous Q6H PRN Toño Bingham PA-C     • heparin (porcine)  4,000 Units Intravenous Q6H PRN Toño Bingham PA-C     • ipratropium  2 puff Inhalation 4x Daily Toño Fenton PA-C     • levalbuterol  0 63 mg Nebulization TID Toño Bingham PA-C     • melatonin  9 mg Oral HS Ayala Gallo MD     • memantine  10 mg Oral BID Ayala Gallo MD     • methylPREDNISolone sodium succinate  40 mg Intravenous Q12H [address] Patience Fenton PA-C     • ondansetron  4 mg Intravenous Q6H PRN Ayala Gallo MD     • oxybutynin  10 mg Oral Daily Ayala Gallo MD     • polyethylene glycol  17 g Oral Daily Ayala Gallo MD     • primidone  50 mg Oral HS Ayala Gallo, MD     • remdesivir  100 mg Intravenous Q24H Zenia Downs MD     • senna  3 tablet Oral BID Zenia Downs MD     • sodium chloride  1,000 mL Intravenous Once Zenia Downs MD     • zinc sulfate  220 mg Oral Daily Zenia Downs MD          Today, Patient Was Seen By: Kady Guillen PA-C    **Please Note: This note may have been constructed using a voice recognition system  **

## 2023-01-16 ENCOUNTER — APPOINTMENT (INPATIENT)
Dept: NON INVASIVE DIAGNOSTICS | Facility: HOSPITAL | Age: 73
End: 2023-01-16

## 2023-01-16 VITALS
WEIGHT: 235 LBS | DIASTOLIC BLOOD PRESSURE: 67 MMHG | RESPIRATION RATE: 20 BRPM | OXYGEN SATURATION: 94 % | HEART RATE: 84 BPM | TEMPERATURE: 97.2 F | HEIGHT: 69 IN | SYSTOLIC BLOOD PRESSURE: 152 MMHG | BODY MASS INDEX: 34.8 KG/M2

## 2023-01-16 LAB
ANION GAP SERPL CALCULATED.3IONS-SCNC: 7 MMOL/L (ref 4–13)
AORTIC ROOT: 3.8 CM
APICAL FOUR CHAMBER EJECTION FRACTION: 58 %
APTT PPP: 56 SECONDS (ref 23–37)
APTT PPP: 77 SECONDS (ref 23–37)
ASCENDING AORTA: 3.4 CM
BASOPHILS # BLD AUTO: 0.03 THOUSANDS/ÂΜL (ref 0–0.1)
BASOPHILS NFR BLD AUTO: 0 % (ref 0–1)
BUN SERPL-MCNC: 18 MG/DL (ref 5–25)
CALCIUM SERPL-MCNC: 8.3 MG/DL (ref 8.3–10.1)
CHLORIDE SERPL-SCNC: 108 MMOL/L (ref 96–108)
CO2 SERPL-SCNC: 29 MMOL/L (ref 21–32)
CREAT SERPL-MCNC: 0.78 MG/DL (ref 0.6–1.3)
DME PARACHUTE DELIVERY DATE ACTUAL: NORMAL
DME PARACHUTE DELIVERY DATE EXPECTED: NORMAL
DME PARACHUTE DELIVERY DATE REQUESTED: NORMAL
DME PARACHUTE DELIVERY NOTE: NORMAL
DME PARACHUTE ITEM DESCRIPTION: NORMAL
DME PARACHUTE ORDER STATUS: NORMAL
DME PARACHUTE SUPPLIER NAME: NORMAL
DME PARACHUTE SUPPLIER PHONE: NORMAL
DOP CALC LVOT AREA: 3.46 CM2
DOP CALC LVOT DIAMETER: 2.1 CM
E WAVE DECELERATION TIME: 218 MS
EOSINOPHIL # BLD AUTO: 0 THOUSAND/ÂΜL (ref 0–0.61)
EOSINOPHIL NFR BLD AUTO: 0 % (ref 0–6)
ERYTHROCYTE [DISTWIDTH] IN BLOOD BY AUTOMATED COUNT: 13.2 % (ref 11.6–15.1)
FRACTIONAL SHORTENING: 34 % (ref 28–44)
GFR SERPL CREATININE-BSD FRML MDRD: 89 ML/MIN/1.73SQ M
GLUCOSE SERPL-MCNC: 137 MG/DL (ref 65–140)
HCT VFR BLD AUTO: 43.1 % (ref 36.5–49.3)
HGB BLD-MCNC: 13.6 G/DL (ref 12–17)
IMM GRANULOCYTES # BLD AUTO: 0.15 THOUSAND/UL (ref 0–0.2)
IMM GRANULOCYTES NFR BLD AUTO: 2 % (ref 0–2)
INTERVENTRICULAR SEPTUM IN DIASTOLE (PARASTERNAL SHORT AXIS VIEW): 0.9 CM
INTERVENTRICULAR SEPTUM: 0.9 CM (ref 0.6–1.1)
LA/AORTA RATIO 2D: 1
LAAS-AP2: 18.4 CM2
LAAS-AP4: 13.9 CM2
LEFT ATRIUM SIZE: 3.8 CM
LEFT INTERNAL DIMENSION IN SYSTOLE: 3.5 CM (ref 2.1–4)
LEFT VENTRICULAR INTERNAL DIMENSION IN DIASTOLE: 5.3 CM (ref 3.5–6)
LEFT VENTRICULAR POSTERIOR WALL IN END DIASTOLE: 0.9 CM
LEFT VENTRICULAR STROKE VOLUME: 87 ML
LVSV (TEICH): 87 ML
LYMPHOCYTES # BLD AUTO: 0.76 THOUSANDS/ÂΜL (ref 0.6–4.47)
LYMPHOCYTES NFR BLD AUTO: 8 % (ref 14–44)
MCH RBC QN AUTO: 30.6 PG (ref 26.8–34.3)
MCHC RBC AUTO-ENTMCNC: 31.6 G/DL (ref 31.4–37.4)
MCV RBC AUTO: 97 FL (ref 82–98)
MONOCYTES # BLD AUTO: 0.68 THOUSAND/ÂΜL (ref 0.17–1.22)
MONOCYTES NFR BLD AUTO: 7 % (ref 4–12)
MV E'TISSUE VEL-SEP: 7 CM/S
MV PEAK A VEL: 0.73 M/S
MV PEAK E VEL: 63 CM/S
MV STENOSIS PRESSURE HALF TIME: 64 MS
MV VALVE AREA P 1/2 METHOD: 3.44 CM2
NEUTROPHILS # BLD AUTO: 7.79 THOUSANDS/ÂΜL (ref 1.85–7.62)
NEUTS SEG NFR BLD AUTO: 83 % (ref 43–75)
NRBC BLD AUTO-RTO: 0 /100 WBCS
PLATELET # BLD AUTO: 166 THOUSANDS/UL (ref 149–390)
PMV BLD AUTO: 9.9 FL (ref 8.9–12.7)
POTASSIUM SERPL-SCNC: 4 MMOL/L (ref 3.5–5.3)
RA PRESSURE ESTIMATED: 3 MMHG
RBC # BLD AUTO: 4.45 MILLION/UL (ref 3.88–5.62)
RIGHT VENTRICLE ID DIMENSION: 4.4 CM
SL CV LV EF: 65
SL CV PED ECHO LEFT VENTRICLE DIASTOLIC VOLUME (MOD BIPLANE) 2D: 137 ML
SL CV PED ECHO LEFT VENTRICLE SYSTOLIC VOLUME (MOD BIPLANE) 2D: 50 ML
SODIUM SERPL-SCNC: 144 MMOL/L (ref 135–147)
TRICUSPID ANNULAR PLANE SYSTOLIC EXCURSION: 3.6 CM
WBC # BLD AUTO: 9.41 THOUSAND/UL (ref 4.31–10.16)

## 2023-01-16 RX ORDER — PREDNISONE 10 MG/1
TABLET ORAL
Qty: 26 TABLET | Refills: 0 | Status: SHIPPED | OUTPATIENT
Start: 2023-01-17 | End: 2023-01-28

## 2023-01-16 RX ADMIN — AMANTADINE HYDROCHLORIDE 100 MG: 100 CAPSULE ORAL at 16:07

## 2023-01-16 RX ADMIN — CARBIDOPA AND LEVODOPA 3 TABLET: 25; 100 TABLET ORAL at 12:06

## 2023-01-16 RX ADMIN — AMANTADINE HYDROCHLORIDE 100 MG: 100 CAPSULE ORAL at 08:30

## 2023-01-16 RX ADMIN — PERFLUTREN 1.2 ML/MIN: 6.52 INJECTION, SUSPENSION INTRAVENOUS at 15:25

## 2023-01-16 RX ADMIN — HEPARIN SODIUM 2000 UNITS: 1000 INJECTION INTRAVENOUS; SUBCUTANEOUS at 06:45

## 2023-01-16 RX ADMIN — LEVALBUTEROL HYDROCHLORIDE 0.63 MG: 0.63 SOLUTION RESPIRATORY (INHALATION) at 14:16

## 2023-01-16 RX ADMIN — CARBIDOPA AND LEVODOPA 3 TABLET: 25; 100 TABLET ORAL at 08:31

## 2023-01-16 RX ADMIN — HEPARIN SODIUM 13.1 UNITS/KG/HR: 10000 INJECTION, SOLUTION INTRAVENOUS at 00:40

## 2023-01-16 RX ADMIN — MEMANTINE 10 MG: 5 TABLET ORAL at 08:30

## 2023-01-16 RX ADMIN — IPRATROPIUM BROMIDE 2 PUFF: 17 AEROSOL, METERED RESPIRATORY (INHALATION) at 12:07

## 2023-01-16 RX ADMIN — FLUTICASONE FUROATE AND VILANTEROL TRIFENATATE 1 PUFF: 200; 25 POWDER RESPIRATORY (INHALATION) at 08:34

## 2023-01-16 RX ADMIN — ZINC SULFATE 220 MG (50 MG) CAPSULE 220 MG: CAPSULE at 12:06

## 2023-01-16 RX ADMIN — ESCITALOPRAM OXALATE 20 MG: 20 TABLET ORAL at 08:30

## 2023-01-16 RX ADMIN — GUAIFENESIN 600 MG: 600 TABLET ORAL at 08:31

## 2023-01-16 RX ADMIN — CARBIDOPA AND LEVODOPA 3 TABLET: 25; 100 TABLET ORAL at 16:07

## 2023-01-16 RX ADMIN — LEVALBUTEROL HYDROCHLORIDE 0.63 MG: 0.63 SOLUTION RESPIRATORY (INHALATION) at 07:50

## 2023-01-16 RX ADMIN — ALBUTEROL SULFATE 1 PUFF: 90 AEROSOL, METERED RESPIRATORY (INHALATION) at 08:34

## 2023-01-16 RX ADMIN — PREDNISONE 40 MG: 20 TABLET ORAL at 08:30

## 2023-01-16 RX ADMIN — IPRATROPIUM BROMIDE 2 PUFF: 17 AEROSOL, METERED RESPIRATORY (INHALATION) at 08:34

## 2023-01-16 NOTE — ASSESSMENT & PLAN NOTE
· SpO2 88% on RA - improved to 98% on 2 L NC --> currently weaned to RA (90-95% on RA today)  · Not on O2 at baseline  · CXR unremarkable   · Procalcitonin normal  · Suspect related COVID infection, COPD exac, see below  · Home O2 eval 1/16 -> requiring 2L with exertion   · Home O2 ordered per CM

## 2023-01-16 NOTE — ASSESSMENT & PLAN NOTE
· On arrival to the ED  · Improved without further intervention  · Continue to monitor per unit protocol  · Propanolol on home medications prescribed for tremor --> Discontinued  · Given parkinson's need to monitor for orthostatic BP

## 2023-01-16 NOTE — DISCHARGE INSTR - AVS FIRST PAGE
Follow up Providers:   Please follow up with PCP in 1 week for post hospital visit  Please follow up with Neurology in 1 week to discuss Parkinson's medications   Please follow up with Pulmonology for formal lung testing  Please follow up with Cardiology (you may attend your appointment with CELESTE MAHAJAN or establish with YAN)    Medications:  Please begin prednisone tapered dosing beginning on 1/17: prednisone 40 mg daily x 2 days, followed by 30 mg x 3 days, followed by 20 mg x 3 days, followed by 10 mg x 3 days   Please STOP taking propranolol, follow up with neurology   Please continue all additional previous home medications     Please use home O2 for oxygen saturations < 90%  Please return to the ED for fevers, chills, chest pain, palpitations, syncope, or other concerns  [[phone]]

## 2023-01-16 NOTE — RESPIRATORY THERAPY NOTE
Home Oxygen Qualifying Test     Patient name: Zackery Robb        : 1950   Date of Test:  2023  Diagnosis:    Home Oxygen Test:    **Medicare Guidelines require item(s) 1-5 on all ambulatory patients or 1 and 2 on non-ambulatory patients  1  Baseline SPO2 on Room Air at rest 90 %     2  SPO2 during exertion on Room Air 84  %  a  During exertion monitor SPO2  If SPO2 increases >=89%, do not add supplemental oxygen    3  SPO2 on Oxygen at Rest 95 % at 2 LPM    4  SPO2 during exertion on Oxygen 91 % at 2 LPM    5  Test performed during exertion activity  [x]  Supplemental Home Oxygen is indicated  []  Client does not qualify for home oxygen  Respiratory Additional Notes- Pt was found on RA at rest, laying down sat 90%  Pt required to walk with walker, in the room due to isolation status, small step paced, desat to 84% on RA  Intervened with 1L NC, saturation improved to 87%, 2L NC 91%  Pt mentioned he has few steps to get into the house but flat after that, educated with how to monitor and when to take a break      Samson Ospina, RT

## 2023-01-16 NOTE — NURSING NOTE
Pt awake during most hrly rounds overnight, often noted to be watching TV, changing channels, on cell this am  Denied pain and eager to return home today  Heparin drip as ordered

## 2023-01-16 NOTE — ASSESSMENT & PLAN NOTE
· Follows with [facility] oncology  · Recently completed radiation treatment in Nov 2022  · Also with history of prostate cancer s/p radiation

## 2023-01-16 NOTE — PLAN OF CARE
Problem: PAIN - ADULT  Goal: Verbalizes/displays adequate comfort level or baseline comfort level  Description: Interventions:  - Encourage patient to monitor pain and request assistance  - Assess pain using appropriate pain scale  - Administer analgesics based on type and severity of pain and evaluate response  - Implement non-pharmacological measures as appropriate and evaluate response  - Consider cultural and social influences on pain and pain management  - Notify physician/advanced practitioner if interventions unsuccessful or patient reports new pain  Outcome: Progressing     Problem: INFECTION - ADULT  Goal: Absence or prevention of progression during hospitalization  Description: INTERVENTIONS:  - Assess and monitor for signs and symptoms of infection  - Monitor lab/diagnostic results  - Monitor all insertion sites, i e  indwelling lines, tubes, and drains  - Monitor endotracheal if appropriate and nasal secretions for changes in amount and color  - [place] appropriate cooling/warming therapies per order  - Administer medications as ordered  - Instruct and encourage patient and family to use good hand hygiene technique  - Identify and instruct in appropriate isolation precautions for identified infection/condition  Outcome: Progressing     Problem: SAFETY ADULT  Goal: Patient will remain free of falls  Description: INTERVENTIONS:  - Educate patient/family on patient safety including physical limitations  - Instruct patient to call for assistance with activity   - Consult OT/PT to assist with strengthening/mobility   - Keep Call bell within reach  - Keep bed low and locked with side rails adjusted as appropriate  - Keep care items and personal belongings within reach  - Initiate and maintain comfort rounds  - Make Fall Risk Sign visible to staff  - Offer Toileting every 2 Hours, in advance of need  - Initiate/Maintain bed alarm  - Obtain necessary fall risk management equipment: socks, alarm  - Apply yellow socks and bracelet for high fall risk patients  - Consider moving patient to room near nurses station  Outcome: Progressing  Goal: Maintain or return to baseline ADL function  Description: INTERVENTIONS:  -  Assess patient's ability to carry out ADLs; assess patient's baseline for ADL function and identify physical deficits which impact ability to perform ADLs (bathing, care of mouth/teeth, toileting, grooming, dressing, etc )  - Assess/evaluate cause of self-care deficits   - Assess range of motion  - Assess patient's mobility; develop plan if impaired  - Assess patient's need for assistive devices and provide as appropriate  - Encourage maximum independence but intervene and supervise when necessary  - Involve family in performance of ADLs  - Assess for home care needs following discharge   - Consider OT consult to assist with ADL evaluation and planning for discharge  - Provide patient education as appropriate  Outcome: Progressing  Goal: Maintains/Returns to pre admission functional level  Description: INTERVENTIONS:  - Perform BMAT or MOVE assessment daily    - Set and communicate daily mobility goal to care team and patient/family/caregiver  - Collaborate with rehabilitation services on mobility goals if consulted  - Perform Range of Motion 3 times a day  - Reposition patient every 2 hours    - Dangle patient 3 times a day  - Stand patient 3 times a day  - Ambulate patient 3 times a day  - Out of bed to chair 3 times a day   - Out of bed for meals 3 times a day  - Out of bed for toileting  - Record patient progress and toleration of activity level   Outcome: Progressing     Problem: DISCHARGE PLANNING  Goal: Discharge to home or other facility with appropriate resources  Description: INTERVENTIONS:  - Identify barriers to discharge w/patient and caregiver  - Arrange for needed discharge resources and transportation as appropriate  - Identify discharge learning needs (meds, wound care, etc )  - Arrange for interpretive services to assist at discharge as needed  - Refer to Case Management Department for coordinating discharge planning if the patient needs post-hospital services based on physician/advanced practitioner order or complex needs related to functional status, cognitive ability, or social support system  Outcome: Progressing     Problem: Knowledge Deficit  Goal: Patient/family/caregiver demonstrates understanding of disease process, treatment plan, medications, and discharge instructions  Description: Complete learning assessment and assess knowledge base  Interventions:  - Provide teaching at level of understanding  - Provide teaching via preferred learning methods  Outcome: Progressing     Problem: MOBILITY - ADULT  Goal: Maintain or return to baseline ADL function  Description: INTERVENTIONS:  -  Assess patient's ability to carry out ADLs; assess patient's baseline for ADL function and identify physical deficits which impact ability to perform ADLs (bathing, care of mouth/teeth, toileting, grooming, dressing, etc )  - Assess/evaluate cause of self-care deficits   - Assess range of motion  - Assess patient's mobility; develop plan if impaired  - Assess patient's need for assistive devices and provide as appropriate  - Encourage maximum independence but intervene and supervise when necessary  - Involve family in performance of ADLs  - Assess for home care needs following discharge   - Consider OT consult to assist with ADL evaluation and planning for discharge  - Provide patient education as appropriate  Outcome: Progressing  Goal: Maintains/Returns to pre admission functional level  Description: INTERVENTIONS:  - Perform BMAT or MOVE assessment daily    - Set and communicate daily mobility goal to care team and patient/family/caregiver  - Collaborate with rehabilitation services on mobility goals if consulted  - Perform Range of Motion 3 times a day  - Reposition patient every 2 hours    - Dangle patient 3 times a day  - Stand patient 3 times a day  - Ambulate patient 3 times a day  - Out of bed to chair 3 times a day   - Out of bed for meals 3 times a day  - Out of bed for toileting  - Record patient progress and toleration of activity level   Outcome: Progressing     Problem: Potential for Falls  Goal: Patient will remain free of falls  Description: INTERVENTIONS:  - Educate patient/family on patient safety including physical limitations  - Instruct patient to call for assistance with activity   - Consult OT/PT to assist with strengthening/mobility   - Keep Call bell within reach  - Keep bed low and locked with side rails adjusted as appropriate  - Keep care items and personal belongings within reach  - Initiate and maintain comfort rounds  - Make Fall Risk Sign visible to staff  - Offer Toileting every 2 Hours, in advance of need  - Initiate/Maintain bed alarm  - Obtain necessary fall risk management equipment: socks, alarm  - Apply yellow socks and bracelet for high fall risk patients  - Consider moving patient to room near nurses station  Outcome: Progressing     Problem: CARDIOVASCULAR - ADULT  Goal: Maintains optimal cardiac output and hemodynamic stability  Description: INTERVENTIONS:  - Monitor I/O, vital signs and rhythm  - Monitor for S/S and trends of decreased cardiac output  - Administer and titrate ordered vasoactive medications to optimize hemodynamic stability  - Assess quality of pulses, skin color and temperature  - Assess for signs of decreased coronary artery perfusion  - Instruct patient to report change in severity of symptoms  Outcome: Progressing  Goal: Absence of cardiac dysrhythmias or at baseline rhythm  Description: INTERVENTIONS:  - Continuous cardiac monitoring, vital signs, obtain 12 lead EKG if ordered  - Administer antiarrhythmic and heart rate control medications as ordered  - Monitor electrolytes and administer replacement therapy as ordered  Outcome: Progressing Problem: RESPIRATORY - ADULT  Goal: Achieves optimal ventilation and oxygenation  Description: INTERVENTIONS:  - Assess for changes in respiratory status  - Assess for changes in mentation and behavior  - Position to facilitate oxygenation and minimize respiratory effort  - Oxygen administered by appropriate delivery if ordered  - Initiate smoking cessation education as indicated  - Encourage broncho-pulmonary hygiene including cough, deep breathe, Incentive Spirometry  - Assess the need for suctioning and aspirate as needed  - Assess and instruct to report SOB or any respiratory difficulty  - Respiratory Therapy support as indicated  Outcome: Progressing     Problem: METABOLIC, FLUID AND ELECTROLYTES - ADULT  Goal: Electrolytes maintained within normal limits  Description: INTERVENTIONS:  - Monitor labs and assess patient for signs and symptoms of electrolyte imbalances  - Administer electrolyte replacement as ordered  - Monitor response to electrolyte replacements, including repeat lab results as appropriate  - Instruct patient on fluid and nutrition as appropriate  Outcome: Progressing  Goal: Fluid balance maintained  Description: INTERVENTIONS:  - Monitor labs   - Monitor I/O and WT  - Instruct patient on fluid and nutrition as appropriate  - Assess for signs & symptoms of volume excess or deficit  Outcome: Progressing     Problem: SKIN/TISSUE INTEGRITY - ADULT  Goal: Skin Integrity remains intact(Skin Breakdown Prevention)  Description: Assess:  -Perform Rahul assessment every x  -Clean and moisturize skin every x  -Inspect skin when repositioning, toileting, and assisting with ADLS  -Assess under medical devices such as x every x  -Assess extremities for adequate circulation and sensation     Bed Management:  -Have minimal linens on bed & keep smooth, unwrinkled  -Change linens as needed when moist or perspiring  -Avoid sitting or lying in one position for more than x hours while in bed  -Keep HOB at xdegrees     Toileting:  -Offer bedside commode  -Assess for incontinence every x  -Use incontinent care products after each incontinent episode such as x    Activity:  -Mobilize patient x times a day  -Encourage activity and walks on unit  -Encourage or provide ROM exercises   -Turn and reposition patient every xxxxxx Hours  -Use appropriate equipment to lift or move patient in bed  -Instruct/ Assist with weight shifting every x when out of bed in chair  -Consider limitation of chair time x hour intervals    Skin Care:  -Avoid use of baby powder, tape, friction and shearing, hot water or constrictive clothing  -Relieve pressure over bony prominences using x  -Do not massage red bony areas    Next Steps:  -Teach patient strategies to minimize risks such as x   -Consider consults to  interdisciplinary teams such as x  Outcome: Progressing     Problem: HEMATOLOGIC - ADULT  Goal: Maintains hematologic stability  Description: INTERVENTIONS  - Assess for signs and symptoms of bleeding or hemorrhage  - Monitor labs  - Administer supportive blood products/factors as ordered and appropriate  Outcome: Progressing     Problem: MUSCULOSKELETAL - ADULT  Goal: Maintain or return mobility to safest level of function  Description: INTERVENTIONS:  - Assess patient's ability to carry out ADLs; assess patient's baseline for ADL function and identify physical deficits which impact ability to perform ADLs (bathing, care of mouth/teeth, toileting, grooming, dressing, etc )  - Assess/evaluate cause of self-care deficits   - Assess range of motion  - Assess patient's mobility  - Assess patient's need for assistive devices and provide as appropriate  - Encourage maximum independence but intervene and supervise when necessary  - Involve family in performance of ADLs  - Assess for home care needs following discharge   - Consider OT consult to assist with ADL evaluation and planning for discharge  - Provide patient education as appropriate  Outcome: Progressing     Problem: Nutrition/Hydration-ADULT  Goal: Nutrient/Hydration intake appropriate for improving, restoring or maintaining nutritional needs  Description: Monitor and assess patient's nutrition/hydration status for malnutrition  Collaborate with interdisciplinary team and initiate plan and interventions as ordered  Monitor patient's weight and dietary intake as ordered or per policy  Utilize nutrition screening tool and intervene as necessary  Determine patient's food preferences and provide high-protein, high-caloric foods as appropriate       INTERVENTIONS:  - Monitor oral intake, urinary output, labs, and treatment plans  - Assess nutrition and hydration status and recommend course of action  - Evaluate amount of meals eaten  - Assist patient with eating if necessary   - Allow adequate time for meals  - Recommend/ encourage appropriate diets, oral nutritional supplements, and vitamin/mineral supplements  - Order, calculate, and assess calorie counts as needed  - Recommend, monitor, and adjust tube feedings and TPN/PPN based on assessed needs  - Assess need for intravenous fluids  - Provide specific nutrition/hydration education as appropriate  - Include patient/family/caregiver in decisions related to nutrition  Outcome: Progressing     Problem: Prexisting or High Potential for Compromised Skin Integrity  Goal: Skin integrity is maintained or improved  Description: INTERVENTIONS:  - Identify patients at risk for skin breakdown  - Assess and monitor skin integrity  - Assess and monitor nutrition and hydration status  - Monitor labs   - Assess for incontinence   - Turn and reposition patient  - Assist with mobility/ambulation  - Relieve pressure over bony prominences  - Avoid friction and shearing  - Provide appropriate hygiene as needed including keeping skin clean and dry  - Evaluate need for skin moisturizer/barrier cream  - Collaborate with interdisciplinary team   - Patient/family teaching  - Consider wound care consult   Outcome: Progressing

## 2023-01-16 NOTE — ASSESSMENT & PLAN NOTE
· Presented to the ED following syncopal episode    Last few days has been having cough/congestion, fever  · Possible evolving covid PNA, see below   · Tmax 102 7 - does not meet any other SIRS criteria  · COVID + 1/12/23  · Treat via mild pathway, requiring 1-2L   · Remdesivir initiated 1/12  · Solumedrol given instead of decadron  · No indication for Baricitinib   · D dimer elevated + O2 + age > 60--> AC with ACS (low) heparin gtt per protocol   · BC x 2 negative at 4 days

## 2023-01-16 NOTE — ASSESSMENT & PLAN NOTE
· Presented to the ED with witnessed syncopal episode at home, reportedly no head strike  · orthostatic BP negative x 2  · Negative orthostatics on 1/12 after IVF, repeat today   · Echo 10/2022: EF 11% grade 1 diastolic dysfunction  No significant valvular disease  · Suspect related to COVID infection + underlying orthostatic hypotension vs bradycardia  · Patient has been on 30 mg propranolol TID for parkinson's/tremor  Wife notes he has had issues with low HR and syncope in the past requiring dose reduction  · Asymptomatic bradycardia 1/14 -1/15 with HR 30-40s in early am despite reduction in propranolol dose   · DC propranolol, patient will f/u with OP neurology to consider restarting   · Repeat Echo EF 58%  · EKG- sinus bradycardia w sinus arrhythmia, no evidence heart block   · Monitor telemetry     1/16: Patient bradycardic again overnight despite holding propranolol on 1/14-1/15  Discussed with cardiology, suspect this is physiologic as patient HR increased > 70s when up/awake and no evidence of heart block on EKGs/tele with sinus bradycardia during events  Cardiology recommending OP follow up and no BB --> patient has secheduled appointment with [facility] cardiology this month

## 2023-01-16 NOTE — ASSESSMENT & PLAN NOTE
· Continue sinemet, primidone, amantadine   · Outpatient follow up with [facility]

## 2023-01-16 NOTE — ASSESSMENT & PLAN NOTE
· History of tobacco use  · Not chronically on O2  · Exacerbation in setting of COVID infection  · Completed IV solumedrol daily , transition to PO today, taper on DC  · scheduled neb treatments, respiratory protocol   · Zithromax x3 days

## 2023-01-17 LAB
BACTERIA BLD CULT: NORMAL
BACTERIA BLD CULT: NORMAL

## 2023-01-24 ENCOUNTER — APPOINTMENT (EMERGENCY)
Dept: RADIOLOGY | Facility: HOSPITAL | Age: 73
End: 2023-01-24
Payer: MEDICARE

## 2023-01-24 ENCOUNTER — HOSPITAL ENCOUNTER (EMERGENCY)
Facility: HOSPITAL | Age: 73
Discharge: HOME | End: 2023-01-24
Attending: STUDENT IN AN ORGANIZED HEALTH CARE EDUCATION/TRAINING PROGRAM
Payer: MEDICARE

## 2023-01-24 VITALS
HEART RATE: 78 BPM | OXYGEN SATURATION: 95 % | TEMPERATURE: 98.6 F | DIASTOLIC BLOOD PRESSURE: 74 MMHG | RESPIRATION RATE: 19 BRPM | SYSTOLIC BLOOD PRESSURE: 125 MMHG

## 2023-01-24 DIAGNOSIS — R55 SYNCOPE, UNSPECIFIED SYNCOPE TYPE: Primary | ICD-10-CM

## 2023-01-24 LAB
ANION GAP SERPL CALC-SCNC: 8 MEQ/L (ref 3–15)
BASOPHILS # BLD: 0.05 K/UL (ref 0.01–0.1)
BASOPHILS NFR BLD: 0.4 %
BUN SERPL-MCNC: 17 MG/DL (ref 8–20)
CALCIUM SERPL-MCNC: 9 MG/DL (ref 8.9–10.3)
CHLORIDE SERPL-SCNC: 104 MEQ/L (ref 98–109)
CO2 SERPL-SCNC: 26 MEQ/L (ref 22–32)
CREAT SERPL-MCNC: 0.9 MG/DL (ref 0.8–1.3)
DIFFERENTIAL METHOD BLD: ABNORMAL
EOSINOPHIL # BLD: 0.03 K/UL (ref 0.04–0.54)
EOSINOPHIL NFR BLD: 0.2 %
ERYTHROCYTE [DISTWIDTH] IN BLOOD BY AUTOMATED COUNT: 14.9 % (ref 11.6–14.4)
GFR SERPL CREATININE-BSD FRML MDRD: >60 ML/MIN/1.73M*2
GLUCOSE SERPL-MCNC: 126 MG/DL (ref 70–99)
HCT VFR BLDCO AUTO: 44 % (ref 40.1–51)
HGB BLD-MCNC: 14.1 G/DL (ref 13.7–17.5)
IMM GRANULOCYTES # BLD AUTO: 0.43 K/UL (ref 0–0.08)
IMM GRANULOCYTES NFR BLD AUTO: 3.2 %
LYMPHOCYTES # BLD: 0.53 K/UL (ref 1.2–3.5)
LYMPHOCYTES NFR BLD: 3.9 %
MCH RBC QN AUTO: 30.9 PG (ref 28–33.2)
MCHC RBC AUTO-ENTMCNC: 32 G/DL (ref 32.2–36.5)
MCV RBC AUTO: 96.3 FL (ref 83–98)
MONOCYTES # BLD: 0.35 K/UL (ref 0.3–1)
MONOCYTES NFR BLD: 2.6 %
NEUTROPHILS # BLD: 12.1 K/UL (ref 1.7–7)
NEUTS SEG NFR BLD: 89.7 %
NRBC BLD-RTO: 0 %
PDW BLD AUTO: 9.9 FL (ref 9.4–12.4)
PLATELET # BLD AUTO: 146 K/UL (ref 150–350)
POTASSIUM SERPL-SCNC: 4.2 MEQ/L (ref 3.6–5.1)
RBC # BLD AUTO: 4.57 M/UL (ref 4.5–5.8)
SODIUM SERPL-SCNC: 138 MEQ/L (ref 136–144)
TROPONIN I SERPL HS-MCNC: 6.7 PG/ML
TROPONIN I SERPL HS-MCNC: 6.9 PG/ML
WBC # BLD AUTO: 13.49 K/UL (ref 3.8–10.5)

## 2023-01-24 PROCEDURE — 3E0337Z INTRODUCTION OF ELECTROLYTIC AND WATER BALANCE SUBSTANCE INTO PERIPHERAL VEIN, PERCUTANEOUS APPROACH: ICD-10-PCS | Performed by: STUDENT IN AN ORGANIZED HEALTH CARE EDUCATION/TRAINING PROGRAM

## 2023-01-24 PROCEDURE — 25800000 HC PHARMACY IV SOLUTIONS

## 2023-01-24 PROCEDURE — 93005 ELECTROCARDIOGRAM TRACING: CPT

## 2023-01-24 PROCEDURE — 85025 COMPLETE CBC W/AUTO DIFF WBC: CPT

## 2023-01-24 PROCEDURE — 96361 HYDRATE IV INFUSION ADD-ON: CPT

## 2023-01-24 PROCEDURE — 84484 ASSAY OF TROPONIN QUANT: CPT

## 2023-01-24 PROCEDURE — 96360 HYDRATION IV INFUSION INIT: CPT

## 2023-01-24 PROCEDURE — 99284 EMERGENCY DEPT VISIT MOD MDM: CPT | Mod: 25

## 2023-01-24 PROCEDURE — 36415 COLL VENOUS BLD VENIPUNCTURE: CPT

## 2023-01-24 PROCEDURE — 71046 X-RAY EXAM CHEST 2 VIEWS: CPT

## 2023-01-24 PROCEDURE — 84484 ASSAY OF TROPONIN QUANT: CPT | Mod: 91

## 2023-01-24 PROCEDURE — 80048 BASIC METABOLIC PNL TOTAL CA: CPT

## 2023-01-24 RX ADMIN — SODIUM CHLORIDE 1000 ML: 9 INJECTION, SOLUTION INTRAVENOUS at 11:41

## 2023-01-24 ASSESSMENT — ENCOUNTER SYMPTOMS
NAUSEA: 0
HEADACHES: 0
LIGHT-HEADEDNESS: 1
FACIAL ASYMMETRY: 0
SPEECH DIFFICULTY: 0
CHILLS: 0
BACK PAIN: 0
TREMORS: 1
ABDOMINAL PAIN: 0
FEVER: 0
NUMBNESS: 0
VOMITING: 0
WEAKNESS: 0
NECK PAIN: 0
DIZZINESS: 0
SHORTNESS OF BREATH: 0

## 2023-01-24 NOTE — ED PROVIDER NOTES
Emergency Medicine Note  HPI   HISTORY OF PRESENT ILLNESS     [place] is a 72 y.o. male with PMHx of pancreatic cancer, syncope, Parkinson's presenting to the emergency department for evaluation of syncope.  Patient reports going in for his lab work when he got out of the car and began to feel lightheaded before having a syncopal episode.  Patient's wife witnessed the fall and reports he slowly came down to the ground and did not hit his head.  She reports he woke up after about 1 second.  Patient also reports having a similar episode about 1 week ago where he was admitted to [facility].  He had a normal echocardiogram at that time with a ejection fraction of 60%.  Patient has an appointment with his cardiologist on Friday.  Patient reports feeling slightly lightheaded currently.  He denies chest pain, shortness of breath, palpitations, abdominal pain, nausea/vomiting.            Patient History   PAST HISTORY     Reviewed from Nursing Triage:       Past Medical History:   Diagnosis Date   • COPD (chronic obstructive pulmonary disease) (CMS/HCC)    • Lung cancer (CMS/HCC)    • Pancreatic cancer (CMS/HCC)    • Parkinson disease (CMS/HCC)    • Prostate cancer (CMS/HCC)        History reviewed. No pertinent surgical history.    History reviewed. No pertinent family history.    Social History     Tobacco Use   • Smoking status: Never   • Smokeless tobacco: Never   Substance Use Topics   • Alcohol use: Yes     Alcohol/week: 7.0 standard drinks     Types: 7 Glasses of wine per week     Comment: glass of wine w dinner   • Drug use: Never         Review of Systems   REVIEW OF SYSTEMS     Review of Systems   Constitutional: Negative for chills and fever.   Respiratory: Negative for shortness of breath.    Cardiovascular: Negative for chest pain.   Gastrointestinal: Negative for abdominal pain, nausea and vomiting.   Musculoskeletal: Negative for back pain and neck pain.   Neurological: Positive for tremors  (chronic and unchanged), syncope and light-headedness. Negative for dizziness, facial asymmetry, speech difficulty, weakness, numbness and headaches.         VITALS     ED Vitals    Date/Time Temp Pulse Resp BP SpO2 [facility]   01/24/23 1430 -- 78 19 -- 95 % BM   01/24/23 1336 -- 80 18 125/74 94 % BM   01/24/23 1125 37 °C (98.6 °F) 92 18 128/68 96 % BM        Pulse Ox %: 96 % (01/24/23 1125)  Pulse Ox Interpretation: Normal (01/24/23 1125)  Heart Rate: 90 (01/24/23 1125)  Rhythm Strip Interpretation: Normal Sinus Rhythm (01/24/23 1125)     Physical Exam   PHYSICAL EXAM     Physical Exam  Constitutional:       General: He is not in acute distress.  HENT:      Head: Normocephalic and atraumatic.   Eyes:      Extraocular Movements: Extraocular movements intact.      Pupils: Pupils are equal, round, and reactive to light.   Cardiovascular:      Rate and Rhythm: Normal rate and regular rhythm.      Pulses: Normal pulses.      Heart sounds: Normal heart sounds.   Pulmonary:      Effort: Pulmonary effort is normal.      Breath sounds: Normal breath sounds.   Abdominal:      General: Abdomen is flat.      Palpations: Abdomen is soft.      Tenderness: There is no abdominal tenderness.   Musculoskeletal:      Cervical back: Normal range of motion and neck supple. No tenderness.   Skin:     General: Skin is warm and dry.      Capillary Refill: Capillary refill takes less than 2 seconds.   Neurological:      General: No focal deficit present.      Mental Status: He is alert and oriented to person, place, and time.      Cranial Nerves: No cranial nerve deficit.      Sensory: No sensory deficit.      Motor: No weakness.      Coordination: Coordination normal.      Comments: tremor noted consistent with Parkinson's.   Psychiatric:         Mood and Affect: Mood normal.         Behavior: Behavior normal.           PROCEDURES     Procedures     DATA     Results     Procedure Component Value Units Date/Time    HS Troponin I (with 2 hour  reflex) [[id number]]  (Normal) Collected: 01/24/23 1138    Specimen: Blood, Venous Updated: 01/24/23 1222     High Sens Troponin I 6.9 pg/mL     Basic metabolic panel [[id number]]  (Abnormal) Collected: 01/24/23 1138    Specimen: Blood, Venous Updated: 01/24/23 1209     Sodium 138 mEQ/L      Potassium 4.2 mEQ/L      Comment: Results obtained on plasma. Plasma Potassium values may be up to 0.4 mEQ/L less than serum values. The differences may be greater for patients with high platelet or white cell counts.        Chloride 104 mEQ/L      CO2 26 mEQ/L      BUN 17 mg/dL      Creatinine 0.9 mg/dL      Glucose 126 mg/dL      Calcium 9.0 mg/dL      eGFR >60.0 mL/min/1.73m*2      Anion Gap 8 mEQ/L     CBC and differential [[id number]]  (Abnormal) Collected: 01/24/23 1138    Specimen: Blood, Venous Updated: 01/24/23 1148     WBC 13.49 K/uL      RBC 4.57 M/uL      Hemoglobin 14.1 g/dL      Hematocrit 44.0 %      MCV 96.3 fL      MCH 30.9 pg      MCHC 32.0 g/dL      RDW 14.9 %      Platelets 146 K/uL      MPV 9.9 fL      Differential Type Auto     nRBC 0.0 %      Immature Granulocytes 3.2 %      Neutrophils 89.7 %      Lymphocytes 3.9 %      Monocytes 2.6 %      Eosinophils 0.2 %      Basophils 0.4 %      Immature Granulocytes, Absolute 0.43 K/uL      Neutrophils, Absolute 12.10 K/uL      Lymphocytes, Absolute 0.53 K/uL      Monocytes, Absolute 0.35 K/uL      Eosinophils, Absolute 0.03 K/uL      Basophils, Absolute 0.05 K/uL           Imaging Results          X-RAY CHEST 2 VIEWS (Final result)  Result time 01/24/23 12:31:42    Final result                 Impression:    IMPRESSION:  1.  Possible trace pleural effusions.                   Narrative:    CLINICAL HISTORY:      Syncope    COMMENT:  Frontal and lateral views of the chest are reviewed without  comparison.    The lungs appear clear.  There may be trace pleural effusions versus pleural thickening.  There is no pneumothorax.  The heart size is normal. There is tortuosity  and calcification of the thoracic  aorta.  The mediastinum is within normal limits.  There are degenerative osseous changes.                                ECG 12 lead   ED Interpretation   ECG 11:37 - nsr 92 bpm, RAD, poor rwp, no st/t wave changes, qt/qtc 372/460 ms.             Scoring tools                                  ED Course & MDM   MDM / ED COURSE / CLINICAL IMPRESSION / DISPO     Medical Decision Making  Patient full cardiac work-up 1 week ago.  He has an appoint with his cardiologist this Friday.      Cyst discussed with EMS and wife    Amount and/or Complexity of Data Reviewed  Independent Historian: spouse and EMS  Labs: ordered.  Radiology: ordered. Decision-making details documented in ED Course.  ECG/medicine tests: ordered and independent interpretation performed.  Discussion of management or test interpretation with external provider(s): External labs reviewed including echocardiogram with ejection fraction of 60% from 1 week ago.        ED Course as of 01/25/23 1346   Tue Jan 24, 2023   1137 ECG 11:37 - nsr 92 bpm, RAD, poor rwp, no st/t wave changes, qt/qtc 372/460 ms.  [NS]   1141 Discussed with PA-C. Agree with plan.    [NS]   1302 X-RAY CHEST 2 VIEWS  IMPRESSION:  1.  Possible trace pleural effusions. [VL]   1320 Pt seen and evaluated with family at bedside. HPI reviewed. Pt with minimal PO intake today, recovering from COVID and had just stood up from out of his vehicle prior to collapse. Suspect syncope event orthostatic. Pt denies chest pain, palpitations or any other cardiac issues. Pt grossly appears well at this time and comfortable. Await repeat trop; if negative plan for dc home with precautions discussed. Q/C addressed. Agree with plan.  [NS]   1446 Patient informed to follow-up with PCP and cardiology.  Patient already has an appointment with cardiology on Friday.  Patient was given return precautions and agreeable to plan. [VL]      ED Course User Index  [NS] Wong Olmos,  DO  [VL] Evette Miller PA C     Clinical Impression      Syncope, unspecified syncope type     _________________     ED Disposition   Discharge                   Evette Miller PA C  01/24/23 2112

## 2023-01-24 NOTE — DISCHARGE INSTRUCTIONS
Please follow up with your primary care provider and cardiologist in 2-3 days. If your symptoms worsen or you develop chest pain, palpitations, shortness of breath, difficultly breathing, lightheadedness, syncope please return to the emergency department.

## 2023-01-25 NOTE — ED ATTESTATION NOTE
I saw and evaluated the patient, participated in the management, and agree with the findings in the above note. We discussed the case and the treatment plan.    Exam:  Patient Vitals for the past 72 hrs:   BP Temp Pulse Resp SpO2   01/24/23 1430 -- -- 78 19 95 %   01/24/23 1336 125/74 -- 80 18 94 %   01/24/23 1125 128/68 37 °C (98.6 °F) 92 18 96 %     VS reviewed, NAD, nontoxic, head at/nc, normal speech, no resp distress, chronic supplemental oxygen in place via NC, normal skin tone, movement with baseline Parkinson's, no focal deficits.      Wong Olmos, DO  01/25/23 1349

## 2023-01-26 LAB
ATRIAL RATE: 92
P AXIS: -7
PR INTERVAL: 176
QRS DURATION: 96
QT INTERVAL: 372
QTC CALCULATION(BAZETT): 460
R AXIS: 98
T WAVE AXIS: 20
VENTRICULAR RATE: 92

## 2023-04-10 PROBLEM — M25.472 LEFT ANKLE SWELLING: Status: ACTIVE | Noted: 2023-04-10

## 2023-04-10 PROBLEM — D70.1 CHEMOTHERAPY INDUCED NEUTROPENIA (HCC): Status: ACTIVE | Noted: 2023-04-10

## 2023-04-10 PROBLEM — T45.1X5A CHEMOTHERAPY INDUCED NEUTROPENIA (HCC): Status: ACTIVE | Noted: 2023-04-10

## 2023-04-10 PROBLEM — A41.9 SEPSIS (HCC): Status: ACTIVE | Noted: 2023-04-10

## 2023-06-09 PROBLEM — A41.9 SEPSIS (HCC): Status: RESOLVED | Noted: 2023-04-10 | Resolved: 2023-06-09

## 2025-07-22 ENCOUNTER — HOSPITAL ENCOUNTER (EMERGENCY)
Age: 75
Discharge: HOME | End: 2025-07-22
Payer: MEDICARE

## 2025-07-29 DIAGNOSIS — K86.89 PANCREATIC INSUFFICIENCY: Primary | ICD-10-CM
